# Patient Record
Sex: MALE | Race: WHITE | NOT HISPANIC OR LATINO | Employment: STUDENT | URBAN - METROPOLITAN AREA
[De-identification: names, ages, dates, MRNs, and addresses within clinical notes are randomized per-mention and may not be internally consistent; named-entity substitution may affect disease eponyms.]

---
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
## 2024-01-03 DIAGNOSIS — D69.1: Primary | ICD-10-CM

## 2024-01-08 DIAGNOSIS — D69.1: Primary | ICD-10-CM

## 2024-01-10 ENCOUNTER — ANESTHESIA EVENT (OUTPATIENT)
Dept: OPERATING ROOM | Facility: HOSPITAL | Age: 19
DRG: 158 | End: 2024-01-10
Payer: COMMERCIAL

## 2024-01-11 ENCOUNTER — HOSPITAL ENCOUNTER (OUTPATIENT)
Facility: HOSPITAL | Age: 19
Setting detail: OUTPATIENT SURGERY
Discharge: STILL ADMITTED OR ADMINISTRATIVE | DRG: 158 | End: 2024-01-11
Attending: DENTIST | Admitting: DENTIST
Payer: COMMERCIAL

## 2024-01-11 ENCOUNTER — HOSPITAL ENCOUNTER (INPATIENT)
Facility: HOSPITAL | Age: 19
LOS: 3 days | Discharge: HOME | DRG: 158 | End: 2024-01-14
Attending: PEDIATRICS | Admitting: PEDIATRICS
Payer: COMMERCIAL

## 2024-01-11 ENCOUNTER — ANESTHESIA (OUTPATIENT)
Dept: OPERATING ROOM | Facility: HOSPITAL | Age: 19
DRG: 158 | End: 2024-01-11
Payer: COMMERCIAL

## 2024-01-11 VITALS
HEART RATE: 76 BPM | SYSTOLIC BLOOD PRESSURE: 113 MMHG | OXYGEN SATURATION: 96 % | DIASTOLIC BLOOD PRESSURE: 57 MMHG | TEMPERATURE: 97.5 F | RESPIRATION RATE: 13 BRPM

## 2024-01-11 DIAGNOSIS — K08.409 S/P TOOTH EXTRACTION: ICD-10-CM

## 2024-01-11 DIAGNOSIS — Z91.89 AT HIGH RISK FOR POSTOPERATIVE COMPLICATIONS: Primary | ICD-10-CM

## 2024-01-11 LAB
ABO GROUP (TYPE) IN BLOOD: NORMAL
ANTIBODY SCREEN: NORMAL
RH FACTOR (ANTIGEN D): NORMAL

## 2024-01-11 PROCEDURE — 2720000007 HC OR 272 NO HCPCS: Performed by: DENTIST

## 2024-01-11 PROCEDURE — A41899 PR DENTAL SURGERY PROCEDURE: Performed by: NURSE ANESTHETIST, CERTIFIED REGISTERED

## 2024-01-11 PROCEDURE — 6360000002 HC RX 636 FACTOR: Mod: JZ

## 2024-01-11 PROCEDURE — 36415 COLL VENOUS BLD VENIPUNCTURE: CPT | Performed by: DENTIST

## 2024-01-11 PROCEDURE — 2500000001 HC RX 250 WO HCPCS SELF ADMINISTERED DRUGS (ALT 637 FOR MEDICARE OP)

## 2024-01-11 PROCEDURE — 3700000002 HC GENERAL ANESTHESIA TIME - EACH INCREMENTAL 1 MINUTE: Performed by: DENTIST

## 2024-01-11 PROCEDURE — 1130000001 HC PRIVATE PED ROOM DAILY

## 2024-01-11 PROCEDURE — 2500000005 HC RX 250 GENERAL PHARMACY W/O HCPCS

## 2024-01-11 PROCEDURE — A41899 PR DENTAL SURGERY PROCEDURE: Performed by: STUDENT IN AN ORGANIZED HEALTH CARE EDUCATION/TRAINING PROGRAM

## 2024-01-11 PROCEDURE — 2780000003 HC OR 278 NO HCPCS: Performed by: DENTIST

## 2024-01-11 PROCEDURE — 7100000002 HC RECOVERY ROOM TIME - EACH INCREMENTAL 1 MINUTE: Performed by: DENTIST

## 2024-01-11 PROCEDURE — 2500000005 HC RX 250 GENERAL PHARMACY W/O HCPCS: Performed by: DENTIST

## 2024-01-11 PROCEDURE — 2500000004 HC RX 250 GENERAL PHARMACY W/ HCPCS (ALT 636 FOR OP/ED)

## 2024-01-11 PROCEDURE — 7100000001 HC RECOVERY ROOM TIME - INITIAL BASE CHARGE: Performed by: DENTIST

## 2024-01-11 PROCEDURE — 99223 1ST HOSP IP/OBS HIGH 75: CPT | Performed by: PEDIATRICS

## 2024-01-11 PROCEDURE — 2500000004 HC RX 250 GENERAL PHARMACY W/ HCPCS (ALT 636 FOR OP/ED): Performed by: STUDENT IN AN ORGANIZED HEALTH CARE EDUCATION/TRAINING PROGRAM

## 2024-01-11 PROCEDURE — 2500000001 HC RX 250 WO HCPCS SELF ADMINISTERED DRUGS (ALT 637 FOR MEDICARE OP): Performed by: DENTIST

## 2024-01-11 PROCEDURE — 0CDWXZ1 EXTRACTION OF UPPER TOOTH, MULTIPLE, EXTERNAL APPROACH: ICD-10-PCS | Performed by: STUDENT IN AN ORGANIZED HEALTH CARE EDUCATION/TRAINING PROGRAM

## 2024-01-11 PROCEDURE — 6360000002 HC RX 636 FACTOR: Mod: JZ | Performed by: STUDENT IN AN ORGANIZED HEALTH CARE EDUCATION/TRAINING PROGRAM

## 2024-01-11 PROCEDURE — A4217 STERILE WATER/SALINE, 500 ML: HCPCS | Performed by: DENTIST

## 2024-01-11 PROCEDURE — 0CDXXZ1 EXTRACTION OF LOWER TOOTH, MULTIPLE, EXTERNAL APPROACH: ICD-10-PCS | Performed by: STUDENT IN AN ORGANIZED HEALTH CARE EDUCATION/TRAINING PROGRAM

## 2024-01-11 PROCEDURE — 86901 BLOOD TYPING SEROLOGIC RH(D): CPT | Performed by: DENTIST

## 2024-01-11 PROCEDURE — A4217 STERILE WATER/SALINE, 500 ML: HCPCS

## 2024-01-11 PROCEDURE — 3600000007 HC OR TIME - EACH INCREMENTAL 1 MINUTE - PROCEDURE LEVEL TWO: Performed by: DENTIST

## 2024-01-11 PROCEDURE — 2500000004 HC RX 250 GENERAL PHARMACY W/ HCPCS (ALT 636 FOR OP/ED): Performed by: DENTIST

## 2024-01-11 PROCEDURE — 3600000002 HC OR TIME - INITIAL BASE CHARGE - PROCEDURE LEVEL TWO: Performed by: DENTIST

## 2024-01-11 PROCEDURE — 3700000001 HC GENERAL ANESTHESIA TIME - INITIAL BASE CHARGE: Performed by: DENTIST

## 2024-01-11 PROCEDURE — 94760 N-INVAS EAR/PLS OXIMETRY 1: CPT

## 2024-01-11 PROCEDURE — 2500000005 HC RX 250 GENERAL PHARMACY W/O HCPCS: Performed by: STUDENT IN AN ORGANIZED HEALTH CARE EDUCATION/TRAINING PROGRAM

## 2024-01-11 RX ORDER — PETROLATUM 420 MG/G
OINTMENT TOPICAL
Status: DISCONTINUED | OUTPATIENT
Start: 2024-01-11 | End: 2024-01-14 | Stop reason: HOSPADM

## 2024-01-11 RX ORDER — LIDOCAINE HYDROCHLORIDE 20 MG/ML
INJECTION, SOLUTION INFILTRATION; PERINEURAL AS NEEDED
Status: DISCONTINUED | OUTPATIENT
Start: 2024-01-11 | End: 2024-01-11

## 2024-01-11 RX ORDER — CEFAZOLIN 1 G/1
INJECTION, POWDER, FOR SOLUTION INTRAVENOUS AS NEEDED
Status: DISCONTINUED | OUTPATIENT
Start: 2024-01-11 | End: 2024-01-11

## 2024-01-11 RX ORDER — SODIUM CHLORIDE 0.9 G/100ML
IRRIGANT IRRIGATION AS NEEDED
Status: DISCONTINUED | OUTPATIENT
Start: 2024-01-11 | End: 2024-01-11 | Stop reason: HOSPADM

## 2024-01-11 RX ORDER — DEXAMETHASONE SODIUM PHOSPHATE 4 MG/ML
INJECTION, SOLUTION INTRA-ARTICULAR; INTRALESIONAL; INTRAMUSCULAR; INTRAVENOUS; SOFT TISSUE AS NEEDED
Status: DISCONTINUED | OUTPATIENT
Start: 2024-01-11 | End: 2024-01-11

## 2024-01-11 RX ORDER — OXYCODONE HYDROCHLORIDE 5 MG/1
10 TABLET ORAL EVERY 4 HOURS PRN
Status: DISCONTINUED | OUTPATIENT
Start: 2024-01-11 | End: 2024-01-11 | Stop reason: HOSPADM

## 2024-01-11 RX ORDER — MORPHINE SULFATE 4 MG/ML
4 INJECTION INTRAVENOUS EVERY 4 HOURS PRN
Status: DISCONTINUED | OUTPATIENT
Start: 2024-01-11 | End: 2024-01-12

## 2024-01-11 RX ORDER — MORPHINE SULFATE 4 MG/ML
2 INJECTION INTRAVENOUS ONCE
Status: COMPLETED | OUTPATIENT
Start: 2024-01-11 | End: 2024-01-11

## 2024-01-11 RX ORDER — MIDAZOLAM HYDROCHLORIDE 1 MG/ML
INJECTION INTRAMUSCULAR; INTRAVENOUS AS NEEDED
Status: DISCONTINUED | OUTPATIENT
Start: 2024-01-11 | End: 2024-01-11

## 2024-01-11 RX ORDER — ACETAMINOPHEN 10 MG/ML
1000 INJECTION, SOLUTION INTRAVENOUS EVERY 6 HOURS
Status: DISCONTINUED | OUTPATIENT
Start: 2024-01-11 | End: 2024-01-12

## 2024-01-11 RX ORDER — CHLORHEXIDINE GLUCONATE ORAL RINSE 1.2 MG/ML
15 SOLUTION DENTAL AS NEEDED
Status: DISCONTINUED | OUTPATIENT
Start: 2024-01-11 | End: 2024-01-11

## 2024-01-11 RX ORDER — PROPOFOL 10 MG/ML
INJECTION, EMULSION INTRAVENOUS AS NEEDED
Status: DISCONTINUED | OUTPATIENT
Start: 2024-01-11 | End: 2024-01-11

## 2024-01-11 RX ORDER — PHENYLEPHRINE HCL IN 0.9% NACL 0.4MG/10ML
SYRINGE (ML) INTRAVENOUS AS NEEDED
Status: DISCONTINUED | OUTPATIENT
Start: 2024-01-11 | End: 2024-01-11

## 2024-01-11 RX ORDER — SODIUM CHLORIDE, SODIUM LACTATE, POTASSIUM CHLORIDE, CALCIUM CHLORIDE 600; 310; 30; 20 MG/100ML; MG/100ML; MG/100ML; MG/100ML
100 INJECTION, SOLUTION INTRAVENOUS CONTINUOUS
Status: DISCONTINUED | OUTPATIENT
Start: 2024-01-11 | End: 2024-01-11 | Stop reason: HOSPADM

## 2024-01-11 RX ORDER — ACETAMINOPHEN 325 MG/1
650 TABLET ORAL EVERY 4 HOURS PRN
Status: DISCONTINUED | OUTPATIENT
Start: 2024-01-11 | End: 2024-01-11 | Stop reason: HOSPADM

## 2024-01-11 RX ORDER — LIDOCAINE HYDROCHLORIDE 10 MG/ML
0.1 INJECTION INFILTRATION; PERINEURAL ONCE
Status: DISCONTINUED | OUTPATIENT
Start: 2024-01-11 | End: 2024-01-11 | Stop reason: HOSPADM

## 2024-01-11 RX ORDER — TRANEXAMIC ACID 10 MG/ML
1300 INJECTION, SOLUTION INTRAVENOUS ONCE
Status: DISCONTINUED | OUTPATIENT
Start: 2024-01-11 | End: 2024-01-11

## 2024-01-11 RX ORDER — MORPHINE SULFATE 4 MG/ML
2 INJECTION INTRAVENOUS EVERY 4 HOURS PRN
Status: DISCONTINUED | OUTPATIENT
Start: 2024-01-11 | End: 2024-01-11

## 2024-01-11 RX ORDER — HYDROMORPHONE HYDROCHLORIDE 1 MG/ML
0.2 INJECTION, SOLUTION INTRAMUSCULAR; INTRAVENOUS; SUBCUTANEOUS EVERY 5 MIN PRN
Status: DISCONTINUED | OUTPATIENT
Start: 2024-01-11 | End: 2024-01-11 | Stop reason: HOSPADM

## 2024-01-11 RX ORDER — TRANEXAMIC ACID 10 MG/ML
10 INJECTION, SOLUTION INTRAVENOUS EVERY 8 HOURS
Status: DISCONTINUED | OUTPATIENT
Start: 2024-01-11 | End: 2024-01-13

## 2024-01-11 RX ORDER — OXYCODONE HYDROCHLORIDE 5 MG/1
5 TABLET ORAL EVERY 4 HOURS PRN
Status: DISCONTINUED | OUTPATIENT
Start: 2024-01-11 | End: 2024-01-11 | Stop reason: HOSPADM

## 2024-01-11 RX ORDER — FENTANYL CITRATE 50 UG/ML
INJECTION, SOLUTION INTRAMUSCULAR; INTRAVENOUS AS NEEDED
Status: DISCONTINUED | OUTPATIENT
Start: 2024-01-11 | End: 2024-01-11

## 2024-01-11 RX ORDER — DEXTROSE MONOHYDRATE AND SODIUM CHLORIDE 5; .9 G/100ML; G/100ML
100 INJECTION, SOLUTION INTRAVENOUS CONTINUOUS
Status: DISCONTINUED | OUTPATIENT
Start: 2024-01-11 | End: 2024-01-12

## 2024-01-11 RX ORDER — SODIUM CHLORIDE, SODIUM LACTATE, POTASSIUM CHLORIDE, CALCIUM CHLORIDE 600; 310; 30; 20 MG/100ML; MG/100ML; MG/100ML; MG/100ML
INJECTION, SOLUTION INTRAVENOUS CONTINUOUS PRN
Status: DISCONTINUED | OUTPATIENT
Start: 2024-01-11 | End: 2024-01-11

## 2024-01-11 RX ORDER — TALC
3 POWDER (GRAM) TOPICAL NIGHTLY PRN
Status: DISCONTINUED | OUTPATIENT
Start: 2024-01-11 | End: 2024-01-14 | Stop reason: HOSPADM

## 2024-01-11 RX ORDER — HYDROMORPHONE HYDROCHLORIDE 1 MG/ML
0.4 INJECTION, SOLUTION INTRAMUSCULAR; INTRAVENOUS; SUBCUTANEOUS EVERY 5 MIN PRN
Status: DISCONTINUED | OUTPATIENT
Start: 2024-01-11 | End: 2024-01-11 | Stop reason: HOSPADM

## 2024-01-11 RX ORDER — LIDOCAINE HYDROCHLORIDE AND EPINEPHRINE 10; 10 MG/ML; UG/ML
INJECTION, SOLUTION INFILTRATION; PERINEURAL AS NEEDED
Status: DISCONTINUED | OUTPATIENT
Start: 2024-01-11 | End: 2024-01-11 | Stop reason: HOSPADM

## 2024-01-11 RX ORDER — ONDANSETRON HYDROCHLORIDE 2 MG/ML
INJECTION, SOLUTION INTRAVENOUS AS NEEDED
Status: DISCONTINUED | OUTPATIENT
Start: 2024-01-11 | End: 2024-01-11

## 2024-01-11 RX ORDER — CHLORHEXIDINE GLUCONATE ORAL RINSE 1.2 MG/ML
15 SOLUTION DENTAL 3 TIMES DAILY PRN
Status: DISCONTINUED | OUTPATIENT
Start: 2024-01-11 | End: 2024-01-13

## 2024-01-11 RX ORDER — TRANEXAMIC ACID 100 MG/ML
INJECTION, SOLUTION INTRAVENOUS AS NEEDED
Status: DISCONTINUED | OUTPATIENT
Start: 2024-01-11 | End: 2024-01-11 | Stop reason: HOSPADM

## 2024-01-11 RX ORDER — ROCURONIUM BROMIDE 10 MG/ML
INJECTION, SOLUTION INTRAVENOUS AS NEEDED
Status: DISCONTINUED | OUTPATIENT
Start: 2024-01-11 | End: 2024-01-11

## 2024-01-11 RX ORDER — TRANEXAMIC ACID 10 MG/ML
1000 INJECTION, SOLUTION INTRAVENOUS EVERY 8 HOURS
Status: DISCONTINUED | OUTPATIENT
Start: 2024-01-11 | End: 2024-01-11

## 2024-01-11 RX ADMIN — MORPHINE SULFATE 4 MG: 4 INJECTION INTRAVENOUS at 21:26

## 2024-01-11 RX ADMIN — TRANEXAMIC ACID 500 MG: 100 INJECTION INTRAVENOUS at 20:22

## 2024-01-11 RX ADMIN — DEXMEDETOMIDINE HYDROCHLORIDE 4 MCG: 100 INJECTION, SOLUTION INTRAVENOUS at 08:42

## 2024-01-11 RX ADMIN — MORPHINE SULFATE 2 MG: 4 INJECTION INTRAVENOUS at 12:13

## 2024-01-11 RX ADMIN — FENTANYL CITRATE 50 MCG: 50 INJECTION, SOLUTION INTRAMUSCULAR; INTRAVENOUS at 07:39

## 2024-01-11 RX ADMIN — SUGAMMADEX 200 MG: 100 INJECTION, SOLUTION INTRAVENOUS at 08:48

## 2024-01-11 RX ADMIN — Medication 6 MG: at 07:19

## 2024-01-11 RX ADMIN — FENTANYL CITRATE 50 MCG: 50 INJECTION, SOLUTION INTRAMUSCULAR; INTRAVENOUS at 08:02

## 2024-01-11 RX ADMIN — DEXMEDETOMIDINE HYDROCHLORIDE 4 MCG: 100 INJECTION, SOLUTION INTRAVENOUS at 08:44

## 2024-01-11 RX ADMIN — TRANEXAMIC ACID 740 MG: 100 INJECTION INTRAVENOUS at 13:03

## 2024-01-11 RX ADMIN — TRANEXAMIC ACID 500 MG: 100 INJECTION INTRAVENOUS at 15:46

## 2024-01-11 RX ADMIN — Medication 80 MCG: at 07:53

## 2024-01-11 RX ADMIN — DEXMEDETOMIDINE HYDROCHLORIDE 4 MCG: 100 INJECTION, SOLUTION INTRAVENOUS at 08:45

## 2024-01-11 RX ADMIN — PROPOFOL 150 MG: 10 INJECTION, EMULSION INTRAVENOUS at 07:40

## 2024-01-11 RX ADMIN — MORPHINE SULFATE 2 MG: 4 INJECTION INTRAVENOUS at 13:34

## 2024-01-11 RX ADMIN — Medication 6 L/MIN: at 08:55

## 2024-01-11 RX ADMIN — ACETAMINOPHEN 1000 MG: 10 INJECTION, SOLUTION INTRAVENOUS at 13:42

## 2024-01-11 RX ADMIN — COAGULATION FACTOR VIIA (RECOMBINANT) 6 MG: KIT at 11:33

## 2024-01-11 RX ADMIN — TRANEXAMIC ACID 740 MG: 100 INJECTION INTRAVENOUS at 22:00

## 2024-01-11 RX ADMIN — ROCURONIUM BROMIDE 60 MG: 10 INJECTION INTRAVENOUS at 07:41

## 2024-01-11 RX ADMIN — HYDROMORPHONE HYDROCHLORIDE 0.4 MG: 1 INJECTION, SOLUTION INTRAMUSCULAR; INTRAVENOUS; SUBCUTANEOUS at 09:36

## 2024-01-11 RX ADMIN — DEXMEDETOMIDINE HYDROCHLORIDE 4 MCG: 100 INJECTION, SOLUTION INTRAVENOUS at 08:47

## 2024-01-11 RX ADMIN — COAGULATION FACTOR VIIA (RECOMBINANT) 6 MG: KIT at 15:46

## 2024-01-11 RX ADMIN — SODIUM CHLORIDE, SODIUM LACTATE, POTASSIUM CHLORIDE, CALCIUM CHLORIDE: 600; 310; 30; 20 INJECTION, SOLUTION INTRAVENOUS at 07:48

## 2024-01-11 RX ADMIN — ACETAMINOPHEN 1000 MG: 10 INJECTION, SOLUTION INTRAVENOUS at 23:40

## 2024-01-11 RX ADMIN — HYDROMORPHONE HYDROCHLORIDE 0.2 MG: 1 INJECTION, SOLUTION INTRAMUSCULAR; INTRAVENOUS; SUBCUTANEOUS at 09:17

## 2024-01-11 RX ADMIN — MIDAZOLAM HYDROCHLORIDE 2 MG: 1 INJECTION, SOLUTION INTRAMUSCULAR; INTRAVENOUS at 07:34

## 2024-01-11 RX ADMIN — SODIUM CHLORIDE, POTASSIUM CHLORIDE, SODIUM LACTATE AND CALCIUM CHLORIDE: 600; 310; 30; 20 INJECTION, SOLUTION INTRAVENOUS at 07:26

## 2024-01-11 RX ADMIN — ONDANSETRON 4 MG: 2 INJECTION, SOLUTION INTRAMUSCULAR; INTRAVENOUS at 08:35

## 2024-01-11 RX ADMIN — LIDOCAINE HYDROCHLORIDE 100 MG: 20 INJECTION, SOLUTION INFILTRATION; PERINEURAL at 07:41

## 2024-01-11 RX ADMIN — COAGULATION FACTOR VIIA (RECOMBINANT) 6 MG: KIT at 13:42

## 2024-01-11 RX ADMIN — COAGULATION FACTOR VIIA (RECOMBINANT) 6 MG: KIT at 22:29

## 2024-01-11 RX ADMIN — COAGULATION FACTOR VIIA (RECOMBINANT) 6 MG: KIT at 20:22

## 2024-01-11 RX ADMIN — DEXAMETHASONE SODIUM PHOSPHATE 10 MG: 4 INJECTION INTRA-ARTICULAR; INTRALESIONAL; INTRAMUSCULAR; INTRAVENOUS; SOFT TISSUE at 08:01

## 2024-01-11 RX ADMIN — DEXMEDETOMIDINE HYDROCHLORIDE 4 MCG: 100 INJECTION, SOLUTION INTRAVENOUS at 08:40

## 2024-01-11 RX ADMIN — DEXTROSE AND SODIUM CHLORIDE 100 ML/HR: 5; 900 INJECTION, SOLUTION INTRAVENOUS at 13:50

## 2024-01-11 RX ADMIN — SODIUM CHLORIDE, POTASSIUM CHLORIDE, SODIUM LACTATE AND CALCIUM CHLORIDE 100 ML/HR: 600; 310; 30; 20 INJECTION, SOLUTION INTRAVENOUS at 09:18

## 2024-01-11 RX ADMIN — CHLORHEXIDINE GLUCONATE 0.12% ORAL RINSE 15 ML: 1.2 LIQUID ORAL at 20:22

## 2024-01-11 RX ADMIN — HYDROMORPHONE HYDROCHLORIDE 0.4 MG: 1 INJECTION, SOLUTION INTRAMUSCULAR; INTRAVENOUS; SUBCUTANEOUS at 09:29

## 2024-01-11 RX ADMIN — ACETAMINOPHEN 1000 MG: 10 INJECTION, SOLUTION INTRAVENOUS at 18:42

## 2024-01-11 RX ADMIN — MORPHINE SULFATE 4 MG: 4 INJECTION INTRAVENOUS at 17:47

## 2024-01-11 RX ADMIN — COAGULATION FACTOR VIIA (RECOMBINANT) 6 MG: KIT at 17:47

## 2024-01-11 RX ADMIN — CEFAZOLIN 2 G: 1 INJECTION, POWDER, FOR SOLUTION INTRAMUSCULAR; INTRAVENOUS at 07:41

## 2024-01-11 SDOH — SOCIAL STABILITY: SOCIAL INSECURITY: ABUSE: PEDIATRIC

## 2024-01-11 SDOH — ECONOMIC STABILITY: HOUSING INSECURITY: DO YOU FEEL UNSAFE GOING BACK TO THE PLACE WHERE YOU LIVE?: NO

## 2024-01-11 SDOH — SOCIAL STABILITY: SOCIAL INSECURITY
ASK PARENT OR GUARDIAN: ARE THERE TIMES WHEN YOU, YOUR CHILD(REN), OR ANY MEMBER OF YOUR HOUSEHOLD FEEL UNSAFE, HARMED, OR THREATENED AROUND PERSONS WITH WHOM YOU KNOW OR LIVE?: NO

## 2024-01-11 SDOH — SOCIAL STABILITY: SOCIAL INSECURITY: ARE THERE ANY APPARENT SIGNS OF INJURIES/BEHAVIORS THAT COULD BE RELATED TO ABUSE/NEGLECT?: NO

## 2024-01-11 SDOH — SOCIAL STABILITY: SOCIAL INSECURITY: HAVE YOU HAD ANY THOUGHTS OF HARMING ANYONE ELSE?: NO

## 2024-01-11 SDOH — HEALTH STABILITY: MENTAL HEALTH: CURRENT SMOKER: 0

## 2024-01-11 SDOH — SOCIAL STABILITY: SOCIAL INSECURITY: WERE YOU ABLE TO COMPLETE ALL THE BEHAVIORAL HEALTH SCREENINGS?: YES

## 2024-01-11 ASSESSMENT — ENCOUNTER SYMPTOMS
FEVER: 0
COLOR CHANGE: 0
DIZZINESS: 0
CHOKING: 0
EYE PAIN: 0
FATIGUE: 0
MYALGIAS: 0
ACTIVITY CHANGE: 0
COUGH: 0
LIGHT-HEADEDNESS: 0

## 2024-01-11 ASSESSMENT — ACTIVITIES OF DAILY LIVING (ADL)
HEARING - LEFT EAR: FUNCTIONAL
DRESSING YOURSELF: INDEPENDENT
JUDGMENT_ADEQUATE_SAFELY_COMPLETE_DAILY_ACTIVITIES: YES
PATIENT'S MEMORY ADEQUATE TO SAFELY COMPLETE DAILY ACTIVITIES?: YES
HEARING - RIGHT EAR: FUNCTIONAL
TOILETING: INDEPENDENT
WALKS IN HOME: INDEPENDENT
GROOMING: INDEPENDENT
BATHING: INDEPENDENT
ADEQUATE_TO_COMPLETE_ADL: YES
FEEDING YOURSELF: INDEPENDENT

## 2024-01-11 ASSESSMENT — PAIN - FUNCTIONAL ASSESSMENT
PAIN_FUNCTIONAL_ASSESSMENT: 0-10

## 2024-01-11 ASSESSMENT — PAIN SCALES - GENERAL
PAINLEVEL_OUTOF10: 6
PAINLEVEL_OUTOF10: 7
PAINLEVEL_OUTOF10: 6
PAINLEVEL_OUTOF10: 0 - NO PAIN
PAINLEVEL_OUTOF10: 6
PAINLEVEL_OUTOF10: 10 - WORST POSSIBLE PAIN
PAINLEVEL_OUTOF10: 7
PAINLEVEL_OUTOF10: 7
PAINLEVEL_OUTOF10: 0 - NO PAIN
PAINLEVEL_OUTOF10: 6
PAINLEVEL_OUTOF10: 7
PAINLEVEL_OUTOF10: 6
PAINLEVEL_OUTOF10: 4
PAINLEVEL_OUTOF10: 7

## 2024-01-11 ASSESSMENT — COLUMBIA-SUICIDE SEVERITY RATING SCALE - C-SSRS
1. IN THE PAST MONTH, HAVE YOU WISHED YOU WERE DEAD OR WISHED YOU COULD GO TO SLEEP AND NOT WAKE UP?: NO
6. HAVE YOU EVER DONE ANYTHING, STARTED TO DO ANYTHING, OR PREPARED TO DO ANYTHING TO END YOUR LIFE?: NO
2. HAVE YOU ACTUALLY HAD ANY THOUGHTS OF KILLING YOURSELF?: NO

## 2024-01-11 ASSESSMENT — PAIN DESCRIPTION - LOCATION: LOCATION: MOUTH

## 2024-01-11 ASSESSMENT — PAIN INTENSITY VAS: VAS_PAIN_GENERAL: 6

## 2024-01-11 NOTE — H&P
History Of Present Illness  Brad Benedict is a 18 y.o. male presenting with partially impacted third molars. Endorses pain associated with third molars and would like them removed. 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    Family History  No family history on file.     Allergies  Ibuprofen, Aspirin, Barley, Nsaids (non-steroidal anti-inflammatory drug), and Wheat    Review of Systems   HENT:  Positive for dental problem.    All other systems reviewed and are negative.       Physical Exam  Constitutional:       Appearance: Normal appearance. He is normal weight.   HENT:      Head:      Comments: JACQUES 45 mm  CNV and VII intact  #17, 32 partially erupted with operculum present  No lymphadenopathy     Right Ear: External ear normal.      Left Ear: External ear normal.      Nose: Nose normal.      Mouth/Throat:      Mouth: Mucous membranes are moist.   Eyes:      Extraocular Movements: Extraocular movements intact.      Conjunctiva/sclera: Conjunctivae normal.   Cardiovascular:      Rate and Rhythm: Normal rate.   Pulmonary:      Effort: Pulmonary effort is normal.   Musculoskeletal:         General: Normal range of motion.      Cervical back: Normal range of motion.   Skin:     General: Skin is warm.    Neurological:      General: No focal deficit present.      Mental Status: He is alert and oriented to person, place, and time.   Psychiatric:         Mood and Affect: Mood normal.         Behavior: Behavior normal.         Thought Content: Thought content normal.          Last Recorded Vitals  There were no vitals taken for this visit.      Assessment/Plan   Active Problems:  There are no active Hospital Problems.      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 Pt to receive NovoSeven 30 minutes prior to procedure to mitigate bleeding risk. Pt denies medications, significant PMH, and any allergies to medications    Discussed risks/ benefits/ alternatives of procedure in pre-op, informed consent obtained. All questions answered with reasonable degree of medical certainty. Patient will be greeted in OR 30 mins after Novoseven infusion         Alexis Townsend DDS

## 2024-01-11 NOTE — HOSPITAL COURSE
Hospital Course (1/11-***)  Brad Benedict is a 18 y.o. w/ Glanzmann Thrombasthenia admitted in the setting of hemorrhage risk after bilateral maxillary and mandibular third molar removal. Received Novoseven pretreatement. Tolerated procedure well. On arrival to the floor, patient was noted to be actively oozing blood from his lower molar sites, soaking through his current packing and into his mouth- but no high volume hemorrhage at this time. Placed on Novoseven q2h and started on TXA both topically and intravenously in the setting of increased bleeding. Initially NPO in the setting of bleeding, but advanced to mechanically soft diet in afternoon of 1/11. Pain managed initially with scheduled IV tylenol, made PO on ***. Required a couple doses of PRN morphine for pain management on post-operative day 0.

## 2024-01-11 NOTE — ANESTHESIA PROCEDURE NOTES
Airway  Date/Time: 1/11/2024 7:41 AM  Urgency: elective      Staffing  Performed: JIMMY   Authorized by: Sergio Magaña DO    Performed by: JIMMY Vargas  Patient location during procedure: OR    Indications and Patient Condition  Indications for airway management: anesthesia  Spontaneous ventilation: present  Sedation level: deep  Preoxygenated: yes  Patient position: sniffing  Mask difficulty assessment: 1 - vent by mask  Planned trial extubation    Final Airway Details  Final airway type: endotracheal airway      Successful airway: ETT  Cuffed: yes   Successful intubation technique: direct laryngoscopy  Endotracheal tube insertion site: oral  Blade: Cristal  Blade size: #4  ETT size (mm): 7.5  Cormack-Lehane Classification: grade I - full view of glottis  Placement verified by: chest auscultation   Measured from: teeth  ETT to teeth (cm): 22  Number of attempts at approach: 1

## 2024-01-11 NOTE — ANESTHESIA PREPROCEDURE EVALUATION
Patient: Brad Benedict    Procedure Information       Date/Time: 01/11/24 0715    Procedure: Extraction Tooth (Bilateral)    Location: [facility] OR 06 / [facility] OR    Surgeons: Leo Suárez DDS            Relevant Problems   Anesthesia (within normal limits)      Cardiovascular (within normal limits)      Endocrine (within normal limits)      GI (within normal limits)      /Renal (within normal limits)      Neuro/Psych   (+) Bilateral lumbar radiculopathy   (+) Depression      Pulmonary   (+) Exercise-induced asthma      Hematology  Received NovoSeven before surgery    (+) Microcytic anemia      Musculoskeletal   (+) Lumbar disc herniation      Hematologic   (+) Glanzmann thrombasthenia (CMS/HCC)       Clinical information reviewed:    Allergies                NPO Detail:  NPO/Void Status  Date of Last Liquid: 01/10/24  Time of Last Liquid: 2000  Date of Last Solid: 01/10/24  Time of Last Solid: 2000         Physical Exam    Airway  Mallampati: I  Neck ROM: full     Cardiovascular - normal exam  Rhythm: regular  Rate: normal     Dental - normal exam     Pulmonary - normal exam  Breath sounds clear to auscultation     Abdominal            Anesthesia Plan    History of general anesthesia?: yes  History of complications of general anesthesia?: no    ASA 2     general     The patient is not a current smoker.    intravenous induction   Anesthetic plan and risks discussed with patient.  Use of blood products discussed with patient who consented to blood products.    Plan discussed with CAA.

## 2024-01-11 NOTE — ANESTHESIA POSTPROCEDURE EVALUATION
Patient: Brad Benedict    Procedure Summary       Date: 01/11/24 Room / Location: [facility] OR 06 / [facility] OR    Anesthesia Start: 0736 Anesthesia Stop: 0905    Procedure: Extraction Of Teeth (Bilateral: Mouth) Diagnosis:     Surgeons: Leo Suárez DDS Responsible Provider: Sergio Magaña DO    Anesthesia Type: general ASA Status: 2            Anesthesia Type: general    Vitals Value Taken Time   /57 01/11/24 0937   Temp 36.4 °C (97.5 °F) 01/11/24 0902   Pulse 75 01/11/24 0940   Resp 14 01/11/24 0940   SpO2 95 % 01/11/24 0940   Vitals shown include unvalidated device data.    Anesthesia Post Evaluation    Patient location during evaluation: PACU  Patient participation: complete - patient participated  Level of consciousness: awake and alert  Pain management: adequate  Airway patency: patent  Cardiovascular status: acceptable  Respiratory status: acceptable  Hydration status: acceptable  Postoperative Nausea and Vomiting: none        There were no known notable events for this encounter.

## 2024-01-11 NOTE — ANESTHESIA PROCEDURE NOTES
Peripheral IV  Date/Time: 1/11/2024 7:47 AM  Inserted by: JIMMY Vargas    Placement  Needle size: 16 G  Laterality: right  Location: hand  Site prep: alcohol  Technique: anatomical landmarks  Attempts: 1

## 2024-01-11 NOTE — ANESTHESIA PROCEDURE NOTES
Peripheral IV  Date/Time: 1/11/2024 7:06 AM  Inserted by: JIMMY Vargas    Placement  Needle size: 20 G  Laterality: left  Location: hand  Local anesthetic: injectable  Site prep: alcohol  Technique: anatomical landmarks  Attempts: 1

## 2024-01-11 NOTE — H&P
History Of Present Illness  Brad Benedict is a 18 y.o. male with Glanzmann Thrombasthenia presenting after intraoperative extraction of impacted third molars by OMFS team. Is followed by Heme-Onc team outpatient. Deemed high risk for excessive bleeding, which resulted in intraoperative management and admission to hematology and oncology service for further observation during post operative period. Medications wise, he is on TXA and iron supplementation at home. Primary heme-onc team relayed plans for IV venofer during this admission. Reports no recent trauma, bleeding, or bruising as of the past week. Denies and cough, congestion, rhinorrhea, fevers, decreased PO, or excessive fatigue at this time. Endorsing 7-8/10 severe pain currently. Tolerated procedure well per OMFS note with approximately 60 mL of blood loss during procedure.     Past Medical History  Past Medical History:   Diagnosis Date    Acute myringitis, right ear 11/11/2015    Myringitis, acute, right    Chronic sinusitis, unspecified 12/02/2016    Clinical sinusitis    Cough, unspecified 10/14/2015    Cough    Pain in throat 12/02/2016    Throat discomfort    Personal history of other diseases of the respiratory system 10/14/2015    History of reactive airway disease    Qualitative platelet defects (CMS/[facility]) 07/20/2022    Glanzmann thrombasthenia       Surgical History  Past Surgical History:   Procedure Laterality Date    OTHER SURGICAL HISTORY  03/27/2019    Nasal cautery        Social History  He reports that he has quit smoking. His smoking use included cigarettes. He has quit using smokeless tobacco. He reports current alcohol use. He reports current drug use. Drug: Marijuana.    Family History  No family history on file.     Allergies  Ibuprofen, Aspirin, Barley, Nsaids (non-steroidal anti-inflammatory drug), and Wheat    Review of Systems   Constitutional:  Negative for activity change, fatigue and fever.   HENT:  Positive for dental problem and  drooling. Negative for congestion.    Eyes:  Negative for pain.   Respiratory:  Negative for cough and choking.    Musculoskeletal:  Negative for myalgias.   Skin:  Negative for color change.   Neurological:  Negative for dizziness and light-headedness.        Physical Exam  Constitutional:       Appearance: Normal appearance.      Comments: Packing in mouth restricting articulation, able to understand and respond to questioning   HENT:      Head: Normocephalic.      Nose: Nose normal.      Mouth/Throat:      Mouth: Mucous membranes are moist.      Comments: Packing inside mouth applied to bilateral molars, soaked through with blood.  Eyes:      Extraocular Movements: Extraocular movements intact.      Conjunctiva/sclera: Conjunctivae normal.      Pupils: Pupils are equal, round, and reactive to light.   Neck:      Comments: Binder in place  Cardiovascular:      Rate and Rhythm: Normal rate and regular rhythm.      Pulses: Normal pulses.      Heart sounds: Normal heart sounds.   Pulmonary:      Effort: Pulmonary effort is normal.      Breath sounds: Normal breath sounds.   Abdominal:      General: Abdomen is flat.      Palpations: Abdomen is soft.   Skin:     Capillary Refill: Capillary refill takes less than 2 seconds.      Coloration: Skin is not pale.      Findings: No rash.   Neurological:      General: No focal deficit present.      Mental Status: He is alert.   Psychiatric:         Mood and Affect: Mood normal.      Comments: Mood appropriate for post-operative period          Last Recorded Vitals  Blood pressure 149/84, pulse 71, temperature 36.3 °C (97.3 °F), resp. rate 20, SpO2 98 %.    Relevant Results  Scheduled medications  acetaminophen, 1,000 mg, intravenous, q6h  coagulation factor VIIa (recomb), 6 mg, intravenous, q2h  tranexamic acid, 500 mg, oral, TID  tranexamic acid, 10 mg/kg (Dosing Weight), intravenous, q8h      Continuous medications  D5 % and 0.9 % sodium chloride, 100 mL/hr      PRN  medications  PRN medications: morphine  Results for orders placed or performed during the hospital encounter of 01/11/24 (from the past 24 hour(s))   Type and screen   Result Value Ref Range    ABO TYPE O     Rh TYPE POS     ANTIBODY SCREEN NEG           Assessment/Plan   Principal Problem:    Glanzmann thrombasthenia (CMS/HCC)  Active Problems:    At high risk for postoperative complications      Brad Benedict is a 18 y.o. w/ Glanzmann Thrombasthenia admitted in the setting of hemorrhage risk after bilateral maxillary and mandibular third molar removal. On arrival to the floor, patient was noted to be actively oozing blood from his lower molar sites, soaking through his current packing and into his mouth- but no high volume hemorrhage at this time. Surgical team assessed patient at bedside, who reported this is to be expected over the first 24 hours. Recommended packing techniques and topical TXA. Otherwise patient is hemodynamically stable and airway is patent. No other significant findings on physical exam. Received NovoSeven prior to operative period and will schedule treatments at this time. Will also begin IV TXA at this time to assist in coagulation.      Requires continued admission at this time for hemodynamic monitoring in the setting of bleeding risk.    Plan:  #Post-op bleeding  - NovoSeven 6 mg q2h  - IV TXA 10 mg/kg q8h  - TXA soaks TID    #Post-op pain  - AVOID NSAID's  - IV Tylenol 1000mg q6h  - IV Morphine 2 mg q4h PRN for severe pain    #Nutrition  - NPO until bleeding under control  - D5NS mIVF    Labs: AM CBCd       Patient seen and staffed with Dr. Ana Muhammad MD    I saw and evaluated the patient. I personally obtained the key and critical portions of the history and physical exam or was physically present for key and critical portions performed by the resident/fellow. I reviewed the resident/fellow's documentation and discussed the patient with the resident/fellow. I agree with  the resident/fellow's medical decision making as documented in the note.

## 2024-01-11 NOTE — OP NOTE
Extraction Of Teeth (B) Operative Note     Date: 2024  OR Location: [facility] OR    Name: Brad Benedict, : 2005, Age: 18 y.o., MRN: [id number], Sex: male    Diagnosis  * No Diagnosis Codes entered * * No Diagnosis Codes entered *     Procedures  Extraction Of Teeth  38785 - [place] UNLISTED PROCEDURE DENTOALVEOLAR STRUCTURES      Surgeons      * Leo Suárez - Primary    Resident/Fellow/Other Assistant:  Surgeon(s) and Role:     * Cyril Mancilla DDS - Resident - Assisting     * Fallon Duff DDS - Resident - Assisting    Procedure Summary  Anesthesia: * No anesthesia type entered *  ASA: II  Anesthesia Staff: Anesthesiologist: Sergio Magaña DO  CRNA: DORIS Decker-CRNA  C-AA: JIMMY Vargas  Estimated Blood Loss: 60 mL  Intra-op Medications:   Medication Name Total Dose   sodium chloride 0.9 % irrigation solution 1,000 mL   lidocaine-epinephrine (Xylocaine W/EPI) 1 %-1:100,000 injection 10 mL   gelatin absorbable (Gelfoam) 100 sponge 1 each   microfibrllar collagen (Hemostat) pad 1 each   tranexamic acid (Cyklokapron) injection 1 g   oxygen (O2) therapy 30 L   coagulation factor VIIa (recomb) (NovoSeven RT) 2 mg (2,000 mcg) injection 6 mg 6 mg              Anesthesia Record               Intraprocedure I/O Totals          Intake    Dexmedetomidine 5.00 mL    The total shown is the total volume documented since Anesthesia Start was filed.    LR infusion 250.00 mL    Total Intake 255 mL       Output    Est. Blood Loss 60 mL    Total Output 60 mL       Net    Net Volume 195 mL          Specimen: No specimens collected     Staff:   Circulator: Daja Alas RN  Scrub Person: Zee Pate; Lilian Dunaway         Drains and/or Catheters: * None in log *    Tourniquet Times:         Implants:     Findings: Impacted teeth #1,16,17,32     Indications: Brad Benedict is an 18 y.o. male who is having surgery for symptomatic, impacted wisdom teeth.     The patient was seen in the preoperative area. The risks, benefits,  complications, treatment options, non-operative alternatives, expected recovery and outcomes were discussed with the patient. The possibilities of reaction to medication, pulmonary aspiration, injury to surrounding structures, bleeding, recurrent infection, the need for additional procedures, failure to diagnose a condition, and creating a complication requiring transfusion or operation were discussed with the patient. The patient concurred with the proposed plan, giving informed consent.  The site of surgery was properly noted/marked if necessary per policy. The patient has been actively warmed in preoperative area.     Confirmed patient received transfusion recommended by heme on in peroperative area 30 minutes prior to procedure.     Patient was transferred to OR bed. Time out was performed. Pre- op antibiotic and steroids given.   Local anesthesia achieved using 1% lidocaine with epi as bilateral ROCAEL blocks and infiltrations.   #15 blade used to create full thickness incisions over impacted teeth #1,16,17, 32. Periosteal elevator used to reflect soft tissue.   Surgical hand piece with fissure bur used to create buccal trough around teeth #17,32.   Teeth #1,16,17,32 extracted. Sites irrigated well with NS.   Gel foam wrapped in surgicel placed in sockets. Primary closure achieved using 3-0 vicryl sutures.   Gauze soaked in TXA placed over top with patient biting down.     Care transferred back to anesthesia team, who proceeded to extubate patient and transfer him to PACU.   Procedure Details: Extraction of teeth #1,16,17,32   Complications:  None; patient tolerated the procedure well.    Disposition: PACU - hemodynamically stable.  Condition: stable         Additional Details:     Attending Attestation:     Leo Suárez  Phone Number: [phone]

## 2024-01-12 LAB
BASOPHILS # BLD MANUAL: 0 X10*3/UL (ref 0–0.1)
BASOPHILS NFR BLD MANUAL: 0 %
DACRYOCYTES BLD QL SMEAR: NORMAL
EOSINOPHIL # BLD MANUAL: 0 X10*3/UL (ref 0–0.7)
EOSINOPHIL NFR BLD MANUAL: 0 %
ERYTHROCYTE [DISTWIDTH] IN BLOOD BY AUTOMATED COUNT: 22.6 % (ref 11.5–14.5)
FERRITIN SERPL-MCNC: 18 NG/ML (ref 20–300)
HCT VFR BLD AUTO: 32.8 % (ref 41–52)
HGB BLD-MCNC: 9.1 G/DL (ref 13.5–17.5)
HGB RETIC QN: 18 PG (ref 28–38)
HYPOCHROMIA BLD QL SMEAR: NORMAL
IMM GRANULOCYTES # BLD AUTO: 0.03 X10*3/UL (ref 0–0.7)
IMM GRANULOCYTES NFR BLD AUTO: 0.4 % (ref 0–0.9)
IMMATURE RETIC FRACTION: 17.2 %
IRON SATN MFR SERPL: ABNORMAL %
IRON SERPL-MCNC: <10 UG/DL (ref 35–150)
LYMPHOCYTES # BLD MANUAL: 1.32 X10*3/UL (ref 1.2–4.8)
LYMPHOCYTES NFR BLD MANUAL: 15.4 %
MCH RBC QN AUTO: 19.1 PG (ref 26–34)
MCHC RBC AUTO-ENTMCNC: 27.7 G/DL (ref 32–36)
MCV RBC AUTO: 69 FL (ref 80–100)
MONOCYTES # BLD MANUAL: 0.73 X10*3/UL (ref 0.1–1)
MONOCYTES NFR BLD MANUAL: 8.5 %
NEUTS SEG # BLD MANUAL: 6.54 X10*3/UL (ref 1.2–7)
NEUTS SEG NFR BLD MANUAL: 76.1 %
NRBC BLD-RTO: 0 /100 WBCS (ref 0–0)
OVALOCYTES BLD QL SMEAR: NORMAL
PLATELET # BLD AUTO: 175 X10*3/UL (ref 150–450)
POLYCHROMASIA BLD QL SMEAR: NORMAL
RBC # BLD AUTO: 4.76 X10*6/UL (ref 4.5–5.9)
RBC MORPH BLD: NORMAL
RETICS #: 0.06 X10*6/UL (ref 0.02–0.12)
RETICS/RBC NFR AUTO: 1.2 % (ref 0.5–2)
SCHISTOCYTES BLD QL SMEAR: NORMAL
TIBC SERPL-MCNC: ABNORMAL UG/DL
TOTAL CELLS COUNTED BLD: 117
UIBC SERPL-MCNC: 392 UG/DL (ref 110–370)
WBC # BLD AUTO: 8.6 X10*3/UL (ref 4.4–11.3)

## 2024-01-12 PROCEDURE — 85007 BL SMEAR W/DIFF WBC COUNT: CPT

## 2024-01-12 PROCEDURE — 6360000002 HC RX 636 FACTOR: Mod: JZ

## 2024-01-12 PROCEDURE — 6360000002 HC RX 636 FACTOR: Mod: JZ | Performed by: STUDENT IN AN ORGANIZED HEALTH CARE EDUCATION/TRAINING PROGRAM

## 2024-01-12 PROCEDURE — 2500000001 HC RX 250 WO HCPCS SELF ADMINISTERED DRUGS (ALT 637 FOR MEDICARE OP)

## 2024-01-12 PROCEDURE — A4217 STERILE WATER/SALINE, 500 ML: HCPCS

## 2024-01-12 PROCEDURE — 85027 COMPLETE CBC AUTOMATED: CPT

## 2024-01-12 PROCEDURE — 2500000004 HC RX 250 GENERAL PHARMACY W/ HCPCS (ALT 636 FOR OP/ED)

## 2024-01-12 PROCEDURE — 83550 IRON BINDING TEST: CPT

## 2024-01-12 PROCEDURE — 85045 AUTOMATED RETICULOCYTE COUNT: CPT

## 2024-01-12 PROCEDURE — 36415 COLL VENOUS BLD VENIPUNCTURE: CPT

## 2024-01-12 PROCEDURE — 99233 SBSQ HOSP IP/OBS HIGH 50: CPT | Performed by: PEDIATRICS

## 2024-01-12 PROCEDURE — 2500000004 HC RX 250 GENERAL PHARMACY W/ HCPCS (ALT 636 FOR OP/ED): Performed by: STUDENT IN AN ORGANIZED HEALTH CARE EDUCATION/TRAINING PROGRAM

## 2024-01-12 PROCEDURE — 2500000001 HC RX 250 WO HCPCS SELF ADMINISTERED DRUGS (ALT 637 FOR MEDICARE OP): Performed by: STUDENT IN AN ORGANIZED HEALTH CARE EDUCATION/TRAINING PROGRAM

## 2024-01-12 PROCEDURE — 82728 ASSAY OF FERRITIN: CPT

## 2024-01-12 PROCEDURE — 1130000001 HC PRIVATE PED ROOM DAILY

## 2024-01-12 PROCEDURE — 2500000005 HC RX 250 GENERAL PHARMACY W/O HCPCS

## 2024-01-12 RX ORDER — MORPHINE SULFATE 4 MG/ML
4 INJECTION INTRAVENOUS EVERY 4 HOURS PRN
Status: DISCONTINUED | OUTPATIENT
Start: 2024-01-12 | End: 2024-01-14 | Stop reason: HOSPADM

## 2024-01-12 RX ORDER — OXYCODONE HYDROCHLORIDE 5 MG/1
5 TABLET ORAL EVERY 6 HOURS PRN
Status: DISCONTINUED | OUTPATIENT
Start: 2024-01-12 | End: 2024-01-13

## 2024-01-12 RX ORDER — AMOXICILLIN 500 MG/1
500 CAPSULE ORAL EVERY 8 HOURS SCHEDULED
Status: DISCONTINUED | OUTPATIENT
Start: 2024-01-12 | End: 2024-01-14 | Stop reason: HOSPADM

## 2024-01-12 RX ORDER — ACETAMINOPHEN 325 MG/1
650 TABLET ORAL EVERY 6 HOURS
Status: DISCONTINUED | OUTPATIENT
Start: 2024-01-12 | End: 2024-01-14 | Stop reason: HOSPADM

## 2024-01-12 RX ADMIN — COAGULATION FACTOR VIIA (RECOMBINANT) 6 MG: KIT at 08:35

## 2024-01-12 RX ADMIN — TRANEXAMIC ACID 740 MG: 100 INJECTION INTRAVENOUS at 05:47

## 2024-01-12 RX ADMIN — ACETAMINOPHEN 650 MG: 325 TABLET ORAL at 17:54

## 2024-01-12 RX ADMIN — MELATONIN 3 MG: 3 TAB ORAL at 21:51

## 2024-01-12 RX ADMIN — MORPHINE SULFATE 4 MG: 4 INJECTION INTRAVENOUS at 20:09

## 2024-01-12 RX ADMIN — TRANEXAMIC ACID 500 MG: 100 INJECTION INTRAVENOUS at 14:21

## 2024-01-12 RX ADMIN — DEXTROSE AND SODIUM CHLORIDE 100 ML/HR: 5; 900 INJECTION, SOLUTION INTRAVENOUS at 06:24

## 2024-01-12 RX ADMIN — ACETAMINOPHEN 1000 MG: 10 INJECTION, SOLUTION INTRAVENOUS at 05:47

## 2024-01-12 RX ADMIN — OXYCODONE HYDROCHLORIDE 5 MG: 5 TABLET ORAL at 22:06

## 2024-01-12 RX ADMIN — COAGULATION FACTOR VIIA (RECOMBINANT) 6 MG: KIT at 10:40

## 2024-01-12 RX ADMIN — COAGULATION FACTOR VIIA (RECOMBINANT) 6 MG: KIT at 02:22

## 2024-01-12 RX ADMIN — COAGULATION FACTOR VIIA (RECOMBINANT) 6 MG: KIT at 06:19

## 2024-01-12 RX ADMIN — MORPHINE SULFATE 4 MG: 4 INJECTION INTRAVENOUS at 10:45

## 2024-01-12 RX ADMIN — MORPHINE SULFATE 4 MG: 4 INJECTION INTRAVENOUS at 02:27

## 2024-01-12 RX ADMIN — MORPHINE SULFATE 4 MG: 4 INJECTION INTRAVENOUS at 06:19

## 2024-01-12 RX ADMIN — OXYCODONE HYDROCHLORIDE 5 MG: 5 TABLET ORAL at 15:58

## 2024-01-12 RX ADMIN — COAGULATION FACTOR VIIA (RECOMBINANT) 6 MG: KIT at 16:34

## 2024-01-12 RX ADMIN — ACETAMINOPHEN 650 MG: 325 TABLET ORAL at 12:08

## 2024-01-12 RX ADMIN — TRANEXAMIC ACID 740 MG: 100 INJECTION INTRAVENOUS at 21:48

## 2024-01-12 RX ADMIN — TRANEXAMIC ACID 740 MG: 100 INJECTION INTRAVENOUS at 15:01

## 2024-01-12 RX ADMIN — COAGULATION FACTOR VIIA (RECOMBINANT) 6 MG: KIT at 20:18

## 2024-01-12 RX ADMIN — COAGULATION FACTOR VIIA (RECOMBINANT) 6 MG: KIT at 04:43

## 2024-01-12 RX ADMIN — TRANEXAMIC ACID 500 MG: 100 INJECTION INTRAVENOUS at 08:35

## 2024-01-12 RX ADMIN — COAGULATION FACTOR VIIA (RECOMBINANT) 6 MG: KIT at 13:32

## 2024-01-12 RX ADMIN — IRON SUCROSE 300 MG: 20 INJECTION, SOLUTION INTRAVENOUS at 16:35

## 2024-01-12 RX ADMIN — AMOXICILLIN 500 MG: 500 CAPSULE ORAL at 15:58

## 2024-01-12 ASSESSMENT — PAIN INTENSITY VAS
VAS_PAIN_GENERAL: 5
VAS_PAIN_GENERAL: 6
VAS_PAIN_GENERAL: 7
VAS_PAIN_BASICVITALS_IP: 6
VAS_PAIN_BASICVITALS_IP: 7
VAS_PAIN_BASICVITALS_IP: 4

## 2024-01-12 ASSESSMENT — PAIN - FUNCTIONAL ASSESSMENT
PAIN_FUNCTIONAL_ASSESSMENT: 0-10

## 2024-01-12 ASSESSMENT — PAIN DESCRIPTION - LOCATION: LOCATION: MOUTH

## 2024-01-12 ASSESSMENT — PAIN SCALES - GENERAL
PAINLEVEL_OUTOF10: 6
PAINLEVEL_OUTOF10: 6
PAINLEVEL_OUTOF10: 5 - MODERATE PAIN
PAINLEVEL_OUTOF10: 4
PAINLEVEL_OUTOF10: 2
PAINLEVEL_OUTOF10: 6
PAINLEVEL_OUTOF10: 4

## 2024-01-12 NOTE — PROGRESS NOTES
Daily Progress Note  [facility]  Pediatric Hematology & Oncology    Patient's Name: Brad Benedict  : 2005  MR#: [id number]  Attending Physician: Sid Perez MD  LOS: Hospital Day: 2    Subjective   Overnight attempted to space NovoSeven to q4hr but returned to q2hr d/t increased bleeding. Morphine x4 ovn.         Objective     Diet:  Dietary Orders (From admission, onward)       Start     Ordered    24  May Participate in Room Service  Once        Question:  .  Answer:  Yes    24 0753    01/11/24 1457  Pediatric diet Regular; Easy to chew  Diet effective now        Question Answer Comment   Diet type Regular    Texture Easy to chew        24                    Medications:  Scheduled Meds: acetaminophen, 650 mg, oral, q6h  amoxicillin, 500 mg, oral, q8h LYNETTE  coagulation factor VIIa (recomb), 6 mg, intravenous, q3h  iron sucrose, 300 mg, intravenous, Once  tranexamic acid, 500 mg, oral, TID  tranexamic acid, 10 mg/kg (Dosing Weight), intravenous, q8h      Continuous Infusions:    PRN Meds: PRN medications: chlorhexidine, melatonin, morphine, white petrolatum    Peripheral IV 24 20 G Left Hand (Active)   Site Assessment Clean;Dry;Intact 24 1100   Dressing Type Transparent 24 1100   Line Status Infusing 24 1100   Dressing Status Clean;Dry;Occlusive 24 1100       Vitals:  Temp:  [36.3 °C (97.3 °F)-36.5 °C (97.7 °F)] 36.4 °C (97.5 °F)  Heart Rate:  [77-93] 77  Resp:  [16-18] 18  BP: (103-134)/(52-66) 114/66  Temp (24hrs), Av.3 °C (97.4 °F), Min:36.3 °C (97.3 °F), Max:36.5 °C (97.7 °F)    Wt Readings from Last 3 Encounters:   24 72.6 kg (160 lb 0.9 oz) (57 %, Z= 0.18)*   23 74.1 kg (163 lb 5.8 oz) (62 %, Z= 0.31)*   22 69 kg (152 lb 2 oz) (53 %, Z= 0.08)*     * Growth percentiles are based on [facility] (Boys, 2-20 Years) data.        Pain Score: 4  VAS Pain Score:  [4-7]       I/O:    Intake/Output Summary  (Last 24 hours) at 1/12/2024 1252  Last data filed at 1/12/2024 1110  Gross per 24 hour   Intake 3110.32 ml   Output 0 ml   Net 3110.32 ml        Exam:   Physical Exam  Constitutional:       General: He is not in acute distress.  HENT:      Head: Normocephalic and atraumatic.      Nose: Nose normal.      Mouth/Throat:      Mouth: Mucous membranes are moist.      Comments: Oozing from inferior extraction site  Eyes:      Extraocular Movements: Extraocular movements intact.      Conjunctiva/sclera: Conjunctivae normal.   Cardiovascular:      Rate and Rhythm: Normal rate and regular rhythm.      Heart sounds: No murmur heard.     No friction rub. No gallop.   Pulmonary:      Effort: Pulmonary effort is normal.      Breath sounds: Normal breath sounds.   Abdominal:      General: There is no distension.      Palpations: Abdomen is soft.      Tenderness: There is no abdominal tenderness.   Skin:     General: Skin is warm and dry.      Capillary Refill: Capillary refill takes less than 2 seconds.   Neurological:      General: No focal deficit present.      Mental Status: He is alert and oriented to person, place, and time.         Lab Studies Reviewed:  Results for orders placed or performed during the hospital encounter of 01/11/24 (from the past 24 hour(s))   CBC and Auto Differential   Result Value Ref Range    WBC 8.6 4.4 - 11.3 x10*3/uL    nRBC 0.0 0.0 - 0.0 /100 WBCs    RBC 4.76 4.50 - 5.90 x10*6/uL    Hemoglobin 9.1 (L) 13.5 - 17.5 g/dL    Hematocrit 32.8 (L) 41.0 - 52.0 %    MCV 69 (L) 80 - 100 fL    MCH 19.1 (L) 26.0 - 34.0 pg    MCHC 27.7 (L) 32.0 - 36.0 g/dL    RDW 22.6 (H) 11.5 - 14.5 %    Platelets 175 150 - 450 x10*3/uL    Immature Granulocytes %, Automated 0.4 0.0 - 0.9 %    Immature Granulocytes Absolute, Automated 0.03 0.00 - 0.70 x10*3/uL   Manual Differential   Result Value Ref Range    Neutrophils %, Manual 76.1 40.0 - 80.0 %    Lymphocytes %, Manual 15.4 13.0 - 44.0 %    Monocytes %, Manual 8.5 2.0 -  10.0 %    Eosinophils %, Manual 0.0 0.0 - 6.0 %    Basophils %, Manual 0.0 0.0 - 2.0 %    Seg Neutrophils Absolute, Manual 6.54 1.20 - 7.00 x10*3/uL    Lymphocytes Absolute, Manual 1.32 1.20 - 4.80 x10*3/uL    Monocytes Absolute, Manual 0.73 0.10 - 1.00 x10*3/uL    Eosinophils Absolute, Manual 0.00 0.00 - 0.70 x10*3/uL    Basophils Absolute, Manual 0.00 0.00 - 0.10 x10*3/uL    Total Cells Counted 117     RBC Morphology See Below     Polychromasia Mild     Hypochromia Mild     RBC Fragments Few     Ovalocytes Few     Teardrop Cells Few            Assessment/Plan   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 This morning he has mild bleeding from 1/4 extraction sites, so we will attempt to space the NovoSeven to q4hr again today. We will transition to PO meds as tolerated as well. We will also start amoxicillin per OMFS and obtain iron studies before administering Venofer per heme. Detailed plan below.     Post-op bleeding  - NovoSeven 6mg q4hr  - IV TXA 10mg/kg q8hr  - TXA soaks TID    Post-op pain  - Tylenol q6hr  - Oxycodone 5mg q6hr PRN    Post-op infection ppx  - Amoxicillin 500mg q8hr    Iron deficiency anemia  - Ferritin, iron studies, retic  - Venofer 300mg     Patient seen and discussed with my attending, Dr. Perez.     Marycruz Valiente MD  PGY-1, Pediatrics    I saw and evaluated the patient. I personally obtained the key and critical portions of the history and physical exam or was physically present for key and critical portions performed by the resident/fellow. I reviewed the resident/fellow's documentation and discussed the patient with the resident/fellow. I agree with the resident/fellow's medical decision making as documented in the note.

## 2024-01-12 NOTE — CARE PLAN
"    The clinical goals for the shift include Patient will have decreased bleeding and mouth will continue to clot this shift  Patient has remained afebrile, VSS on room air this shift. One episode of increased BP of which the team was notified. Patient is tolerating regular, easy to chew diet without emesis and has had adequate intake and output. Patient has had increased pain this shift. Required one dose of PRN morphine and then one dose of PRN oxy after prn order change to PO. Patient still having pain after prn oxy. Scheduled tylenol was due at this time. Team was also ntoified of increased pain at this time. Patient still having bleeding from the mouth. Per patient \"feels like yes than overnight.\" Patient better able to tolerate swallowing PO pills this shift. Mom was at bedside.   Patient received factor doses per orders. Patient received iron transfusion per orders. Patient required new IV, tolerated placement well.       Problem: Pain  Goal: Takes deep breaths with improved pain control throughout the shift  Outcome: Progressing  Goal: Turns in bed with improved pain control throughout the shift  Outcome: Progressing  Goal: Walks with improved pain control throughout the shift  Outcome: Progressing  Goal: Performs ADL's with improved pain control throughout shift  Outcome: Progressing  Goal: Participates in PT with improved pain control throughout the shift  Outcome: Progressing  Goal: Free from opioid side effects throughout the shift  Outcome: Progressing  Goal: Free from acute confusion related to pain meds throughout the shift  Outcome: Progressing     "

## 2024-01-12 NOTE — PROGRESS NOTES
Brad Benedict is a 18 y.o. male on day 1 of admission. The patient is 1 day status post extraction of teeth #1,16,17,32.     Subjective   The patient has minor oozing of blood at the extraction site #32. The patient has post extraction pain that is being managed with primary team. The patient reported having discomfort due to swelling of his mandible bilaterally. The patient expressed that he does not want to swallow due to sore throat.      Objective     CONSTITUTIONAL/GENERAL:  Well appearing. No acute distress. Laying comfortably in bed.     HEAD:  Bilateral mandibular swelling as a result of extraction yesterday.    EYES:  EOM intact, sclera normal, without conjunctival erythema or drainage.    EARS:  Normal external ears, external auditory canals.     NOSE:  External nose midline, no bleeding or drainage. Sinuses non-tender to palpation bilaterally.    ORAL CAVITY/OROPHARYNX/LIPS:  Normal and moist mucous membranes, normal floor of mouth/tongue/oropharnx, no masses or lesions. No fractures or avulsion of teeth. Stable and reproducible occlusion. No TMJ clicking or popping. Maximal incisal opening ~25mm.  Extraction site # 1,16,17 hemostatic with no oozing. Site #32 with minor oozing. No liver clot observed. Right lower lip swelling likely due to lip biting.     NECK/LMYPH/GLANDS: Parotid and submandibular glands without edema or tenderness bilaterally. Patient tolerating oral secretions without issue. No lymphadenopathy of head or neck. Mild neck limited motion due to post-op swelling.     RESPIRATION/VOICE:  Breathing comfortably on room air. No hoarseness, wheezing, stridor, or other abnormality.     NEURO:  Awake and alert. Oriented to person, place, and time.  Cranial nerves II-XII grossly intact and symmetric bilaterally. No anesthesia or paresthesia of CN V bilaterally. CN VII without defects, patient showing full facial range of expression.    EXTREMITIES: No lesions or abnormalities visualized. Denies  "tenderness to palpation bilaterally.      PSYCH:  Appropriate mood and affect.      Last Recorded Vitals  Blood pressure 117/64, pulse 82, temperature 36.4 °C (97.5 °F), temperature source Axillary, resp. rate 16, height 1.746 m (5' 8.74\"), weight 72.6 kg (160 lb 0.9 oz), SpO2 98 %.  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 The patient still have minor bleeding from extraction site #32.     Recs:   - Non - chew diet for 7 days.   - Chlorhexidine 0.12% oral rinse TID for 7 days   - Medrol dose pack if not contraindicated   - Pain management per primary team   - Amoxicillin 500mg for 7 days   - [facility] will follow-up later today afternoon to confirm that the patient is not bleeding  - The patient will follow-up at [facility] out patient clinic 1 week after discharge. Clinic information included at the end of the note      Shree Granado DMD  Oral and Maxillofacial Surgery intern   Pager [phone]    The Oral & Maxillofacial Surgery clinic is located on the first floor within the [facility] ([address] [place] [address]).  Our office phone number for patients is [phone]. For any questions regarding patient care, please contact the [email or web address] at [phone]. Patients can contact the [email or web address] through the hospital  [phone].     "

## 2024-01-12 NOTE — SIGNIFICANT EVENT
Evaluated patient for hemostasis ~8:00 PM. Pt was resting comfortably in bed without gauze packing intraorally. Pt denied active bleeding and on exam this was confirmed. Surgical sites were slightly oozing, no obvious bleeding. Reviewed instructions for hemostatic control with patient and mother, all questions asked and answered.    OMFS will continue to follow until discharge    Please reach out with any questions or concerns #[phone]    Fredis Townsend PGY1

## 2024-01-13 LAB
BASOPHILS # BLD MANUAL: 0.06 X10*3/UL (ref 0–0.1)
BASOPHILS NFR BLD MANUAL: 0.8 %
EOSINOPHIL # BLD MANUAL: 0 X10*3/UL (ref 0–0.7)
EOSINOPHIL NFR BLD MANUAL: 0 %
ERYTHROCYTE [DISTWIDTH] IN BLOOD BY AUTOMATED COUNT: 21.9 % (ref 11.5–14.5)
HCT VFR BLD AUTO: 31.6 % (ref 41–52)
HGB BLD-MCNC: 8.7 G/DL (ref 13.5–17.5)
HYPOCHROMIA BLD QL SMEAR: NORMAL
IMM GRANULOCYTES # BLD AUTO: 0.02 X10*3/UL (ref 0–0.7)
IMM GRANULOCYTES NFR BLD AUTO: 0.3 % (ref 0–0.9)
LYMPHOCYTES # BLD MANUAL: 2.21 X10*3/UL (ref 1.2–4.8)
LYMPHOCYTES NFR BLD MANUAL: 31.6 %
MCH RBC QN AUTO: 19 PG (ref 26–34)
MCHC RBC AUTO-ENTMCNC: 27.5 G/DL (ref 32–36)
MCV RBC AUTO: 69 FL (ref 80–100)
MONOCYTES # BLD MANUAL: 0.66 X10*3/UL (ref 0.1–1)
MONOCYTES NFR BLD MANUAL: 9.4 %
NEUTROPHILS # BLD MANUAL: 3.95 X10*3/UL (ref 1.2–7.7)
NEUTS BAND # BLD MANUAL: 0.06 X10*3/UL (ref 0–0.7)
NEUTS BAND NFR BLD MANUAL: 0.9 %
NEUTS SEG # BLD MANUAL: 3.89 X10*3/UL (ref 1.2–7)
NEUTS SEG NFR BLD MANUAL: 55.6 %
NRBC BLD-RTO: 0 /100 WBCS (ref 0–0)
OVALOCYTES BLD QL SMEAR: NORMAL
PLATELET # BLD AUTO: 162 X10*3/UL (ref 150–450)
RBC # BLD AUTO: 4.57 X10*6/UL (ref 4.5–5.9)
RBC MORPH BLD: NORMAL
SCHISTOCYTES BLD QL SMEAR: NORMAL
TOTAL CELLS COUNTED BLD: 117
VARIANT LYMPHS # BLD MANUAL: 0.12 X10*3/UL (ref 0–0.5)
VARIANT LYMPHS NFR BLD: 1.7 %
WBC # BLD AUTO: 7 X10*3/UL (ref 4.4–11.3)

## 2024-01-13 PROCEDURE — 2500000001 HC RX 250 WO HCPCS SELF ADMINISTERED DRUGS (ALT 637 FOR MEDICARE OP)

## 2024-01-13 PROCEDURE — 1130000001 HC PRIVATE PED ROOM DAILY

## 2024-01-13 PROCEDURE — 2500000004 HC RX 250 GENERAL PHARMACY W/ HCPCS (ALT 636 FOR OP/ED)

## 2024-01-13 PROCEDURE — 85027 COMPLETE CBC AUTOMATED: CPT

## 2024-01-13 PROCEDURE — 2500000004 HC RX 250 GENERAL PHARMACY W/ HCPCS (ALT 636 FOR OP/ED): Performed by: STUDENT IN AN ORGANIZED HEALTH CARE EDUCATION/TRAINING PROGRAM

## 2024-01-13 PROCEDURE — 36415 COLL VENOUS BLD VENIPUNCTURE: CPT

## 2024-01-13 PROCEDURE — 2500000005 HC RX 250 GENERAL PHARMACY W/O HCPCS

## 2024-01-13 PROCEDURE — 99233 SBSQ HOSP IP/OBS HIGH 50: CPT | Performed by: PEDIATRICS

## 2024-01-13 PROCEDURE — 85007 BL SMEAR W/DIFF WBC COUNT: CPT

## 2024-01-13 PROCEDURE — 6360000002 HC RX 636 FACTOR: Mod: JZ | Performed by: STUDENT IN AN ORGANIZED HEALTH CARE EDUCATION/TRAINING PROGRAM

## 2024-01-13 PROCEDURE — 2500000001 HC RX 250 WO HCPCS SELF ADMINISTERED DRUGS (ALT 637 FOR MEDICARE OP): Performed by: STUDENT IN AN ORGANIZED HEALTH CARE EDUCATION/TRAINING PROGRAM

## 2024-01-13 PROCEDURE — 6360000002 HC RX 636 FACTOR: Mod: JZ

## 2024-01-13 PROCEDURE — A4217 STERILE WATER/SALINE, 500 ML: HCPCS

## 2024-01-13 RX ORDER — TRANEXAMIC ACID 650 MG/1
1300 TABLET ORAL 3 TIMES DAILY
Status: DISCONTINUED | OUTPATIENT
Start: 2024-01-13 | End: 2024-01-14 | Stop reason: HOSPADM

## 2024-01-13 RX ORDER — METHYLPREDNISOLONE 4 MG/1
4 TABLET ORAL ONCE
Status: COMPLETED | OUTPATIENT
Start: 2024-01-14 | End: 2024-01-14

## 2024-01-13 RX ORDER — OXYCODONE HYDROCHLORIDE 5 MG/1
5 TABLET ORAL EVERY 4 HOURS PRN
Status: DISCONTINUED | OUTPATIENT
Start: 2024-01-13 | End: 2024-01-14 | Stop reason: HOSPADM

## 2024-01-13 RX ORDER — METHYLPREDNISOLONE 4 MG/1
4 TABLET ORAL ONCE
Status: DISCONTINUED | OUTPATIENT
Start: 2024-01-14 | End: 2024-01-14 | Stop reason: HOSPADM

## 2024-01-13 RX ORDER — CHLORHEXIDINE GLUCONATE ORAL RINSE 1.2 MG/ML
15 SOLUTION DENTAL 3 TIMES DAILY
Status: DISCONTINUED | OUTPATIENT
Start: 2024-01-13 | End: 2024-01-14 | Stop reason: HOSPADM

## 2024-01-13 RX ADMIN — OXYCODONE HYDROCHLORIDE 5 MG: 5 TABLET ORAL at 09:49

## 2024-01-13 RX ADMIN — OXYCODONE HYDROCHLORIDE 5 MG: 5 TABLET ORAL at 23:00

## 2024-01-13 RX ADMIN — COAGULATION FACTOR VIIA (RECOMBINANT) 6 MG: KIT at 08:44

## 2024-01-13 RX ADMIN — METHYLPREDNISOLONE 24 MG: 16 TABLET ORAL at 17:25

## 2024-01-13 RX ADMIN — ACETAMINOPHEN 650 MG: 325 TABLET ORAL at 11:26

## 2024-01-13 RX ADMIN — OXYCODONE HYDROCHLORIDE 5 MG: 5 TABLET ORAL at 03:51

## 2024-01-13 RX ADMIN — MELATONIN 3 MG: 3 TAB ORAL at 23:05

## 2024-01-13 RX ADMIN — PETROLATUM: 420 OINTMENT TOPICAL at 20:42

## 2024-01-13 RX ADMIN — AMOXICILLIN 500 MG: 500 CAPSULE ORAL at 16:50

## 2024-01-13 RX ADMIN — ACETAMINOPHEN 650 MG: 325 TABLET ORAL at 05:39

## 2024-01-13 RX ADMIN — MORPHINE SULFATE 4 MG: 4 INJECTION INTRAVENOUS at 01:44

## 2024-01-13 RX ADMIN — ACETAMINOPHEN 650 MG: 325 TABLET ORAL at 17:25

## 2024-01-13 RX ADMIN — COAGULATION FACTOR VIIA (RECOMBINANT) 6 MG: KIT at 04:29

## 2024-01-13 RX ADMIN — OXYCODONE HYDROCHLORIDE 5 MG: 5 TABLET ORAL at 16:49

## 2024-01-13 RX ADMIN — ACETAMINOPHEN 650 MG: 325 TABLET ORAL at 00:22

## 2024-01-13 RX ADMIN — OXYCODONE HYDROCHLORIDE 5 MG: 5 TABLET ORAL at 13:48

## 2024-01-13 RX ADMIN — CHLORHEXIDINE GLUCONATE 0.12% ORAL RINSE 15 ML: 1.2 LIQUID ORAL at 20:45

## 2024-01-13 RX ADMIN — TRANEXAMIC ACID 740 MG: 100 INJECTION INTRAVENOUS at 05:39

## 2024-01-13 RX ADMIN — AMOXICILLIN 500 MG: 500 CAPSULE ORAL at 08:44

## 2024-01-13 RX ADMIN — TRANEXAMIC ACID 1300 MG: 650 TABLET ORAL at 20:41

## 2024-01-13 RX ADMIN — TRANEXAMIC ACID 500 MG: 100 INJECTION INTRAVENOUS at 08:44

## 2024-01-13 RX ADMIN — AMOXICILLIN 500 MG: 500 CAPSULE ORAL at 00:22

## 2024-01-13 RX ADMIN — COAGULATION FACTOR VIIA (RECOMBINANT) 6 MG: KIT at 00:34

## 2024-01-13 RX ADMIN — COAGULATION FACTOR VIIA (RECOMBINANT) 6 MG: KIT at 22:12

## 2024-01-13 RX ADMIN — COAGULATION FACTOR VIIA (RECOMBINANT) 6 MG: KIT at 14:15

## 2024-01-13 RX ADMIN — TRANEXAMIC ACID 1300 MG: 650 TABLET ORAL at 15:34

## 2024-01-13 RX ADMIN — MORPHINE SULFATE 4 MG: 4 INJECTION INTRAVENOUS at 05:39

## 2024-01-13 ASSESSMENT — PAIN SCALES - GENERAL
PAINLEVEL_OUTOF10: 7
PAINLEVEL_OUTOF10: 7
PAINLEVEL_OUTOF10: 5 - MODERATE PAIN
PAINLEVEL_OUTOF10: 6
PAINLEVEL_OUTOF10: 5 - MODERATE PAIN

## 2024-01-13 ASSESSMENT — PAIN INTENSITY VAS
VAS_PAIN_GENERAL: 8
VAS_PAIN_GENERAL: 7
VAS_PAIN_GENERAL: 7
VAS_PAIN_GENERAL: 8

## 2024-01-13 ASSESSMENT — PAIN - FUNCTIONAL ASSESSMENT
PAIN_FUNCTIONAL_ASSESSMENT: 0-10

## 2024-01-13 ASSESSMENT — PAIN DESCRIPTION - LOCATION: LOCATION: MOUTH

## 2024-01-13 NOTE — PROGRESS NOTES
Brad Benedict is a 18 y.o. male on day 2 of admission. The patient is 2 days status post extraction of teeth #1,16,17,32.     Subjective   The patient is hemostatic at the extraction site #32. The patient has post extraction pain that is being managed with primary team. The patient reported having discomfort due to swelling of his mandible bilaterally, but improved from previous exam.     Objective     CONSTITUTIONAL/GENERAL:  Well appearing. No acute distress. Laying comfortably in bed. OOBTB    HEAD:  Bilateral mandibular swelling R>L, consistent with procedure.    EYES:  EOM intact, sclera normal, without conjunctival erythema or drainage.    EARS:  Normal external ears, external auditory canals.     NOSE:  External nose midline, no bleeding or drainage. Sinuses non-tender to palpation bilaterally.    ORAL CAVITY/OROPHARYNX/LIPS:  Normal and moist mucous membranes, normal floor of mouth/tongue/oropharnx, no masses or lesions. No fractures or avulsion of teeth. Stable and reproducible occlusion. No TMJ clicking or popping. Maximal incisal opening ~30mm.  Extraction site # 1,16,17, 32 hemostatic with no oozing. Right lower lip swelling likely due to lip biting.     NECK/LMYPH/GLANDS: Parotid and submandibular glands without edema or tenderness bilaterally. Patient tolerating oral secretions without issue. No lymphadenopathy of head or neck. Mild neck limited motion due to post-op swelling.     RESPIRATION/VOICE:  Breathing comfortably on room air. No hoarseness, wheezing, stridor, or other abnormality.     NEURO:  Awake and alert. Oriented to person, place, and time.  Cranial nerves II-XII grossly intact and symmetric bilaterally. No anesthesia or paresthesia of CN V bilaterally. CN VII without defects, patient showing full facial range of expression.    EXTREMITIES: No lesions or abnormalities visualized. Denies tenderness to palpation bilaterally.      PSYCH:  Appropriate mood and affect.      Last Recorded  "Vitals  Blood pressure 129/60, pulse 74, temperature 37.2 °C (99 °F), temperature source Axillary, resp. rate 16, height 1.746 m (5' 8.74\"), weight 72.6 kg (160 lb 0.9 oz), SpO2 97 %.  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 The patient is hemostatic  and clear for [place] [facility] [place], given he maintains hemostasis.     Recs:   - Non - chew diet for 7 days.   - Chlorhexidine 0.12% oral rinse TID for 7 days   - Medrol dose pack if not contraindicated   - Pain management per primary team   - Amoxicillin 500mg for 7 days   - FS signing off  - The patient will [place] [facility] [place] week after discharge.     The Oral & Maxillofacial Surgery clinic is located on the first floor within the [facility] ([address] [place] [address]).  Our office phone number for patients is [phone]. For any questions regarding patient care, please contact the [email or web address] at [phone]. Patients can contact the [email or web address]-asnt through the hospital  [phone].     Ofe Flores DMD  [facility] PGY-1   Pager [phone]        "

## 2024-01-13 NOTE — CARE PLAN
The patient's goals for the shift include      The clinical goals for the shift include Patient will continue to have decreased bleeding during my shift.    Brad vitals stable and afebrile throughout the night. Pt has had no complaints of bleeding and has not had to suction at all throughout the night. Pt has had consistent pain with breakthrough Oxy and Morphine. Pt has been eating and drinking and is off IVF.

## 2024-01-14 VITALS
WEIGHT: 160.05 LBS | SYSTOLIC BLOOD PRESSURE: 131 MMHG | BODY MASS INDEX: 23.71 KG/M2 | RESPIRATION RATE: 20 BRPM | DIASTOLIC BLOOD PRESSURE: 60 MMHG | TEMPERATURE: 98.4 F | OXYGEN SATURATION: 100 % | HEART RATE: 88 BPM | HEIGHT: 69 IN

## 2024-01-14 DIAGNOSIS — K08.89 TOOTH PAIN: Primary | ICD-10-CM

## 2024-01-14 PROCEDURE — 94760 N-INVAS EAR/PLS OXIMETRY 1: CPT

## 2024-01-14 PROCEDURE — 2500000001 HC RX 250 WO HCPCS SELF ADMINISTERED DRUGS (ALT 637 FOR MEDICARE OP)

## 2024-01-14 PROCEDURE — 6360000002 HC RX 636 FACTOR: Mod: JZ | Performed by: STUDENT IN AN ORGANIZED HEALTH CARE EDUCATION/TRAINING PROGRAM

## 2024-01-14 PROCEDURE — 2500000004 HC RX 250 GENERAL PHARMACY W/ HCPCS (ALT 636 FOR OP/ED)

## 2024-01-14 RX ORDER — OXYCODONE HYDROCHLORIDE 5 MG/1
5 TABLET ORAL EVERY 6 HOURS
Qty: 8 TABLET | Refills: 0 | Status: SHIPPED | OUTPATIENT
Start: 2024-01-14 | End: 2024-01-16

## 2024-01-14 RX ORDER — OXYCODONE HYDROCHLORIDE 5 MG/1
5 TABLET ORAL EVERY 6 HOURS PRN
Qty: 8 TABLET | Refills: 0 | Status: CANCELLED | OUTPATIENT
Start: 2024-01-14 | End: 2024-01-16

## 2024-01-14 RX ORDER — CHLORHEXIDINE GLUCONATE ORAL RINSE 1.2 MG/ML
15 SOLUTION DENTAL 3 TIMES DAILY
Qty: 100 ML | Refills: 0 | Status: SHIPPED | OUTPATIENT
Start: 2024-01-14

## 2024-01-14 RX ORDER — METHYLPREDNISOLONE 4 MG/1
TABLET ORAL
Qty: 21 TABLET | Refills: 0 | Status: SHIPPED | OUTPATIENT
Start: 2024-01-14 | End: 2024-01-21

## 2024-01-14 RX ORDER — TRANEXAMIC ACID 650 MG/1
1300 TABLET ORAL 3 TIMES DAILY
Qty: 42 TABLET | Refills: 0 | Status: SHIPPED | OUTPATIENT
Start: 2024-01-14 | End: 2024-01-21

## 2024-01-14 RX ORDER — AMOXICILLIN 500 MG/1
500 CAPSULE ORAL EVERY 8 HOURS SCHEDULED
Qty: 15 CAPSULE | Refills: 0 | Status: SHIPPED | OUTPATIENT
Start: 2024-01-14 | End: 2024-01-19

## 2024-01-14 RX ADMIN — AMOXICILLIN 500 MG: 500 CAPSULE ORAL at 00:05

## 2024-01-14 RX ADMIN — COAGULATION FACTOR VIIA (RECOMBINANT) 6 MG: KIT at 06:07

## 2024-01-14 RX ADMIN — METHYLPREDNISOLONE 4 MG: 4 TABLET ORAL at 12:10

## 2024-01-14 RX ADMIN — ACETAMINOPHEN 650 MG: 325 TABLET ORAL at 12:10

## 2024-01-14 RX ADMIN — ACETAMINOPHEN 650 MG: 325 TABLET ORAL at 06:07

## 2024-01-14 RX ADMIN — ACETAMINOPHEN 650 MG: 325 TABLET ORAL at 00:05

## 2024-01-14 RX ADMIN — AMOXICILLIN 500 MG: 500 CAPSULE ORAL at 08:39

## 2024-01-14 RX ADMIN — TRANEXAMIC ACID 1300 MG: 650 TABLET ORAL at 08:39

## 2024-01-14 RX ADMIN — METHYLPREDNISOLONE 4 MG: 4 TABLET ORAL at 05:09

## 2024-01-14 RX ADMIN — CHLORHEXIDINE GLUCONATE 0.12% ORAL RINSE 15 ML: 1.2 LIQUID ORAL at 08:39

## 2024-01-14 RX ADMIN — COAGULATION FACTOR VIIA (RECOMBINANT) 6 MG: KIT at 14:07

## 2024-01-14 ASSESSMENT — PAIN SCALES - GENERAL
PAINLEVEL_OUTOF10: 5 - MODERATE PAIN
PAINLEVEL_OUTOF10: 5 - MODERATE PAIN
PAINLEVEL_OUTOF10: 4
PAINLEVEL_OUTOF10: 6

## 2024-01-14 ASSESSMENT — PAIN - FUNCTIONAL ASSESSMENT
PAIN_FUNCTIONAL_ASSESSMENT: 0-10

## 2024-01-14 NOTE — CARE PLAN
Patient AVSS on RA throughout shift. Patient states pain at tolerable level with scheduled medications per MAR, no PRNs given this shift. Discharge orders received. IV removed, catheter intact. Discharge instructions and medications reviewed with patient. Patient states understanding with no further questions or concerns at this time. Patient left unit with family and all patient belongings.

## 2024-01-14 NOTE — PROGRESS NOTES
Daily Progress Note  [facility]  Pediatric Hematology & Oncology    Patient's Name: Brad Benedict  : 2005  MR#: [id number]  Attending Physician: Sid Perez MD  LOS: Hospital Day: 3    Subjective   Overnight Brad had significant pain () associated w/ htn breakthrough his q6hr oxycodone, so q4hr morphine was restarted. He received two doses of morphine.        Objective     Diet:  Dietary Orders (From admission, onward)       Start     Ordered    24  May Participate in Room Service  Once        Question:  .  Answer:  Yes    24 0753    01/11/24 145  Pediatric diet Regular; Easy to chew  Diet effective now        Question Answer Comment   Diet type Regular    Texture Easy to chew        24                    Medications:  Scheduled Meds: acetaminophen, 650 mg, oral, q6h  amoxicillin, 500 mg, oral, q8h LYNETTE  coagulation factor VIIa (recomb), 6 mg, intravenous, q8h  [START ON 2024] methylPREDNISolone, 4 mg, oral, Once  [START ON 2024] methylPREDNISolone, 4 mg, oral, Once  [START ON 2024] methylPREDNISolone, 4 mg, oral, Once  tranexamic acid, 1,300 mg, oral, TID      Continuous Infusions:    PRN Meds: PRN medications: chlorhexidine, melatonin, morphine, oxyCODONE, white petrolatum    Peripheral IV 24 22 G 2.5 cm Right;Anterior Forearm (Active)   Site Assessment Clean;Dry;Intact 24 1600   Dressing Type Transparent 24 1600   Line Status Flushed 24 1600   Dressing Status Clean;Dry;Occlusive 24 1600       Vitals:  Temp:  [36.6 °C (97.9 °F)-37.2 °C (99 °F)] 36.6 °C (97.9 °F)  Heart Rate:  [74-90] 77  Resp:  [16-18] 16  BP: (126-149)/(60-80) 133/71  Temp (24hrs), Av.8 °C (98.2 °F), Min:36.6 °C (97.9 °F), Max:37.2 °C (99 °F)    Wt Readings from Last 3 Encounters:   24 72.6 kg (160 lb 0.9 oz) (57 %, Z= 0.18)*   23 74.1 kg (163 lb 5.8 oz) (62 %, Z= 0.31)*   22 69 kg (152 lb 2 oz) (53 %, Z=  0.08)*     * Growth percentiles are based on [facility] (Boys, 2-20 Years) data.     Pain Score: 5 - Moderate pain  VAS Pain Score:  [7-8]        I/O:    Intake/Output Summary (Last 24 hours) at 1/13/2024 2013  Last data filed at 1/13/2024 1754  Gross per 24 hour   Intake 2440 ml   Output 0 ml   Net 2440 ml        Exam:   Physical Exam  Constitutional:       General: He is not in acute distress.  HENT:      Head: Normocephalic and atraumatic.      Comments: Bilateral symmetric buccal swelling     Nose: Nose normal.      Mouth/Throat:      Mouth: Mucous membranes are moist.      Comments: Unable to open mouth as wide as prior exams, no active gingival bleeding appreciated  Eyes:      Extraocular Movements: Extraocular movements intact.      Conjunctiva/sclera: Conjunctivae normal.   Cardiovascular:      Rate and Rhythm: Normal rate and regular rhythm.      Heart sounds: No murmur heard.     No friction rub. No gallop.   Pulmonary:      Effort: Pulmonary effort is normal.      Breath sounds: Normal breath sounds.   Abdominal:      General: There is no distension.      Palpations: Abdomen is soft.      Tenderness: There is no abdominal tenderness.   Skin:     General: Skin is warm and dry.      Capillary Refill: Capillary refill takes less than 2 seconds.   Neurological:      General: No focal deficit present.      Mental Status: He is alert and oriented to person, place, and time.         Lab Studies Reviewed:  Results for orders placed or performed during the hospital encounter of 01/11/24 (from the past 24 hour(s))   CBC and Auto Differential   Result Value Ref Range    WBC 7.0 4.4 - 11.3 x10*3/uL    nRBC 0.0 0.0 - 0.0 /100 WBCs    RBC 4.57 4.50 - 5.90 x10*6/uL    Hemoglobin 8.7 (L) 13.5 - 17.5 g/dL    Hematocrit 31.6 (L) 41.0 - 52.0 %    MCV 69 (L) 80 - 100 fL    MCH 19.0 (L) 26.0 - 34.0 pg    MCHC 27.5 (L) 32.0 - 36.0 g/dL    RDW 21.9 (H) 11.5 - 14.5 %    Platelets 162 150 - 450 x10*3/uL    Immature Granulocytes %,  Automated 0.3 0.0 - 0.9 %    Immature Granulocytes Absolute, Automated 0.02 0.00 - 0.70 x10*3/uL   Manual Differential   Result Value Ref Range    Neutrophils %, Manual 55.6 40.0 - 80.0 %    Bands %, Manual 0.9 0.0 - 5.0 %    Lymphocytes %, Manual 31.6 13.0 - 44.0 %    Monocytes %, Manual 9.4 2.0 - 10.0 %    Eosinophils %, Manual 0.0 0.0 - 6.0 %    Basophils %, Manual 0.8 0.0 - 2.0 %    Atypical Lymphocytes %, Manual 1.7 0.0 - 2.0 %    Seg Neutrophils Absolute, Manual 3.89 1.20 - 7.00 x10*3/uL    Bands Absolute, Manual 0.06 0.00 - 0.70 x10*3/uL    Lymphocytes Absolute, Manual 2.21 1.20 - 4.80 x10*3/uL    Monocytes Absolute, Manual 0.66 0.10 - 1.00 x10*3/uL    Eosinophils Absolute, Manual 0.00 0.00 - 0.70 x10*3/uL    Basophils Absolute, Manual 0.06 0.00 - 0.10 x10*3/uL    Atypical Lymphs Absolute, Manual 0.12 0.00 - 0.50 x10*3/uL    Total Cells Counted 117     Neutrophils Absolute, Manual 3.95 1.20 - 7.70 x10*3/uL    RBC Morphology See Below     Hypochromia Marked     RBC Fragments Few     Ovalocytes Many        Imaging Studies Reviewed:          Assessment/Plan   Brad is a 17yo w/ Glanzmann thrombasthenia POD 2 wisdom tooth extraction admitted for monitoring d/t hemorrhage risk currently on NovoSeven, IV TXA, & topical TXA. His bleeding has improved, so we will space his NovoSeven to q6hr and transition to oral TXA for a 7 day total course. His pain remains poorly controlled, so we will condense his prn oxycodone to q4hr and monitor his response with a goal of no morphine requirements. We will also start a medrol dose pack in an effort to reduce his swelling and subsequently reduce his pain. Detailed plan below.     Post-op bleeding  - NovoSeven 6mg q6hr  - PO TXA 1300mg q8hr    Post-op pain  - Tylenol q6hr  - Oxycodone 5mg q4hr PRN  - Morphine 4mg q4hr PRN  - Medrol dose pack     Post-op infection ppx  - Amoxicillin 500mg q8hr  - Chlorhexidine mouthwash TID     Iron deficiency anemia  - S/p venofer, no further  inpatient intervention required    Patient seen and discussed with my attending, Dr. Perez.    Marycruz Valiente MD  PGY-1, Pediatrics  I saw and evaluated the patient. I personally obtained the key and critical portions of the history and physical exam or was physically present for key and critical portions performed by the resident/fellow. I reviewed the resident/fellow's documentation and discussed the patient with the resident/fellow. I agree with the resident/fellow's medical decision making as documented in the note.

## 2024-01-14 NOTE — CARE PLAN
Patient remains afebrile with VSS on RA. Patient with pain 5-6/10 when awake and required one dose PRN oxy for pain this shift. Patient Poing well before bed, patient also with UOP before bed. Patient tolerating all scheduled and PRN meds given this shift. No family at the bedside this shift.  Problem: Pain  Goal: Takes deep breaths with improved pain control throughout the shift  Outcome: Progressing  Goal: Turns in bed with improved pain control throughout the shift  Outcome: Progressing  Goal: Walks with improved pain control throughout the shift  Outcome: Progressing  Goal: Performs ADL's with improved pain control throughout shift  Outcome: Progressing  Goal: Participates in PT with improved pain control throughout the shift  Outcome: Progressing  Goal: Free from opioid side effects throughout the shift  Outcome: Progressing  Goal: Free from acute confusion related to pain meds throughout the shift  Outcome: Progressing

## 2024-01-14 NOTE — DISCHARGE INSTRUCTIONS
It was a pleasure taking care of you at [place]! You were admitted to monitor for bleeding after having your wisdom teeth extracted. Please continue using the antiseptic mouthwash 3x a day through 1/17. You should also continue taking the medrol (steroids), amoxicillin, and TXA as prescribed.

## 2024-01-14 NOTE — DISCHARGE SUMMARY
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 He received Novoseven prior to the procedure, and was initiated on Novoseven scheduled q 2 hours, IV tranexamic acid and topical tranexamic acid due to significant bleeding from right lower wisdom tooth extraction site. He continued to have mild oozing and Novoseven was slowly weaned to q 4 hrs in which bleeding cessation was achieved after ~36 hours. Novoseven was further weaned to q 6 hours, then q 8 hrs without additional bleeding. Brad had significant pain at extraction sites requiring IV morphine which was transitioned to oxycodone. He also had swelling of bilateral jaw region (R>L) and was prescribed solumedrol pack per OMFS. Brad has iron deficinecy anemia and received a Venofer infusion throughout this hospitalization. Brad has overall clinically improved and is comfortable being discharged home. He will continue Amoxicillin x 7 days, Solumedrol x 5 days, and tranexamic acid x 7 days. He will follow up with [facility] team as outpatient.     Pertinent Physical Exam At Time of Discharge  Physical Exam  Constitutional:       General: He is not in acute distress.  HENT:      Head: Normocephalic and atraumatic.      Comments: Bilateral symmetric buccal swelling, improved. Unable to fully open mouth     Nose: Nose normal.      Mouth/Throat:      Mouth: Mucous membranes are moist.      Comments: Significant bruising at posterior oropharynx likely secondary to trauma from intubation, no active bleeding at extraction sites, sutures at extraction site visualized and intact  Eyes:      Extraocular Movements: Extraocular movements intact.      Conjunctiva/sclera: Conjunctivae normal.    Cardiovascular:      Rate and Rhythm: Normal rate and regular rhythm.      Heart sounds: No murmur heard.     No friction rub. No gallop.   Pulmonary:      Effort: Pulmonary effort is normal.      Breath sounds: Normal breath sounds.   Abdominal:      General: There is no distension.      Palpations: Abdomen is soft.      Tenderness: There is no abdominal tenderness.   Skin:     General: Skin is warm and dry.      Capillary Refill: Capillary refill takes less than 2 seconds.   Neurological:      General: No focal deficit present.      Mental Status: He is alert and oriented to person, place, and time.   Psychiatric:         Mood and Affect: Mood normal.         Behavior: Behavior normal.         Home Medications     Medication List      ASK your doctor about these medications     Normal Saline Flush flush; Generic drug: sodium chloride 0.9%; Infuse 10   mL into a venous catheter if needed for line care (to flush when infusing   NovoSeven).       Outpatient Follow-Up  No future appointments.    Marycruz Valiente MD

## 2024-07-11 DIAGNOSIS — D69.1: Primary | ICD-10-CM

## 2024-07-23 ENCOUNTER — DOCUMENTATION (OUTPATIENT)
Dept: PEDIATRIC HEMATOLOGY/ONCOLOGY | Facility: HOSPITAL | Age: 19
End: 2024-07-23

## 2024-07-23 ENCOUNTER — HOSPITAL ENCOUNTER (OUTPATIENT)
Dept: PEDIATRIC HEMATOLOGY/ONCOLOGY | Facility: HOSPITAL | Age: 19
Discharge: HOME | End: 2024-07-23
Payer: COMMERCIAL

## 2024-07-23 VITALS
TEMPERATURE: 97 F | RESPIRATION RATE: 18 BRPM | HEART RATE: 75 BPM | OXYGEN SATURATION: 97 % | BODY MASS INDEX: 25.96 KG/M2 | WEIGHT: 175.27 LBS | HEIGHT: 69 IN | DIASTOLIC BLOOD PRESSURE: 73 MMHG | SYSTOLIC BLOOD PRESSURE: 123 MMHG

## 2024-07-23 DIAGNOSIS — R04.0 EPISTAXIS: Primary | ICD-10-CM

## 2024-07-23 DIAGNOSIS — D50.9 MICROCYTIC ANEMIA: ICD-10-CM

## 2024-07-23 LAB
BASOPHILS # BLD AUTO: 0.05 X10*3/UL (ref 0–0.1)
BASOPHILS NFR BLD AUTO: 1.1 %
EOSINOPHIL # BLD AUTO: 0.05 X10*3/UL (ref 0–0.7)
EOSINOPHIL NFR BLD AUTO: 1.1 %
ERYTHROCYTE [DISTWIDTH] IN BLOOD BY AUTOMATED COUNT: 15.1 % (ref 11.5–14.5)
FERRITIN SERPL-MCNC: 22 NG/ML (ref 20–300)
HCT VFR BLD AUTO: 35.5 % (ref 41–52)
HGB BLD-MCNC: 10.8 G/DL (ref 13.5–17.5)
HGB RETIC QN: 19 PG (ref 28–38)
IMM GRANULOCYTES # BLD AUTO: 0 X10*3/UL (ref 0–0.7)
IMM GRANULOCYTES NFR BLD AUTO: 0 % (ref 0–0.9)
IMMATURE RETIC FRACTION: 19.6 %
IRON SATN MFR SERPL: 3 % (ref 25–45)
IRON SERPL-MCNC: 14 UG/DL (ref 35–150)
LYMPHOCYTES # BLD AUTO: 1.68 X10*3/UL (ref 1.2–4.8)
LYMPHOCYTES NFR BLD AUTO: 35.8 %
MCH RBC QN AUTO: 21.7 PG (ref 26–34)
MCHC RBC AUTO-ENTMCNC: 30.4 G/DL (ref 32–36)
MCV RBC AUTO: 71 FL (ref 80–100)
MONOCYTES # BLD AUTO: 0.59 X10*3/UL (ref 0.1–1)
MONOCYTES NFR BLD AUTO: 12.6 %
NEUTROPHILS # BLD AUTO: 2.32 X10*3/UL (ref 1.2–7.7)
NEUTROPHILS NFR BLD AUTO: 49.4 %
NRBC BLD-RTO: 0 /100 WBCS (ref 0–0)
PLATELET # BLD AUTO: 167 X10*3/UL (ref 150–450)
RBC # BLD AUTO: 4.98 X10*6/UL (ref 4.5–5.9)
RETICS #: 0.08 X10*6/UL (ref 0.02–0.12)
RETICS/RBC NFR AUTO: 1.7 % (ref 0.5–2)
TIBC SERPL-MCNC: 463 UG/DL (ref 240–445)
UIBC SERPL-MCNC: 449 UG/DL (ref 110–370)
WBC # BLD AUTO: 4.7 X10*3/UL (ref 4.4–11.3)

## 2024-07-23 PROCEDURE — 82728 ASSAY OF FERRITIN: CPT | Performed by: STUDENT IN AN ORGANIZED HEALTH CARE EDUCATION/TRAINING PROGRAM

## 2024-07-23 PROCEDURE — 85045 AUTOMATED RETICULOCYTE COUNT: CPT | Performed by: STUDENT IN AN ORGANIZED HEALTH CARE EDUCATION/TRAINING PROGRAM

## 2024-07-23 PROCEDURE — 99214 OFFICE O/P EST MOD 30 MIN: CPT | Performed by: PEDIATRICS

## 2024-07-23 PROCEDURE — 99214 OFFICE O/P EST MOD 30 MIN: CPT | Performed by: STUDENT IN AN ORGANIZED HEALTH CARE EDUCATION/TRAINING PROGRAM

## 2024-07-23 PROCEDURE — 36415 COLL VENOUS BLD VENIPUNCTURE: CPT | Performed by: STUDENT IN AN ORGANIZED HEALTH CARE EDUCATION/TRAINING PROGRAM

## 2024-07-23 PROCEDURE — 83540 ASSAY OF IRON: CPT | Performed by: STUDENT IN AN ORGANIZED HEALTH CARE EDUCATION/TRAINING PROGRAM

## 2024-07-23 PROCEDURE — 85025 COMPLETE CBC W/AUTO DIFF WBC: CPT | Performed by: STUDENT IN AN ORGANIZED HEALTH CARE EDUCATION/TRAINING PROGRAM

## 2024-07-23 ASSESSMENT — ENCOUNTER SYMPTOMS
GASTROINTESTINAL NEGATIVE: 1
RESPIRATORY NEGATIVE: 1
CONSTITUTIONAL NEGATIVE: 1
DEPRESSION: 0
NEUROLOGICAL NEGATIVE: 1
ALLERGIC/IMMUNOLOGIC NEGATIVE: 1
CARDIOVASCULAR NEGATIVE: 1
LOSS OF SENSATION IN FEET: 0
BRUISES/BLEEDS EASILY: 1
PSYCHIATRIC NEGATIVE: 1
ENDOCRINE NEGATIVE: 1
EYES NEGATIVE: 1
MUSCULOSKELETAL NEGATIVE: 1
OCCASIONAL FEELINGS OF UNSTEADINESS: 0

## 2024-07-23 ASSESSMENT — PAIN SCALES - GENERAL: PAINLEVEL: 0-NO PAIN

## 2024-07-23 ASSESSMENT — PATIENT HEALTH QUESTIONNAIRE - PHQ9
2. FEELING DOWN, DEPRESSED OR HOPELESS: NOT AT ALL
1. LITTLE INTEREST OR PLEASURE IN DOING THINGS: NOT AT ALL
SUM OF ALL RESPONSES TO PHQ9 QUESTIONS 1 AND 2: 0

## 2024-07-23 NOTE — PROGRESS NOTES
"[facility] PT Consult    Patient: Brad Benedict  MRN: [id number]  07/23/24    Assessment   Brad is a 19 y.o. with PMHx Glanzmann's thrombasthenia who was seen by Physical Therapy in clinic on 7/23/2024. Pt with history or L bicep bleed August 2023 and R ankle injury August 2023. Today pt states that he [place] time in the [place] study abroad. He is now working as a  for the summer before he goes back to school this fall. States he has two more years of school.    Pt states while he was studying abroad that he had a L elbow bleed d/t bumping arm on door frame (April 2024). Not sure if in the joint vs muscle. States he got multiple doses of novoseven (~20) after injury and states it kept re-bleeding and lasted for about a month or month and a half.    Upon assessment, as noted below, pt demonstrating 4/5 L elbow flexion strength and 4/5 L elbow extension strength upon submax MMT. 4+/5  R elbow flexion strength and 4+/5 R elbow extension strength.  Normal ROM grossly R/L elbow. No crepitus, swelling, pain or warmth noted at either elbow. Mild atrophy grossly throughout L UE noted.     Pt also states that he had erma calf bleeds from surfing while abroad (May). States that surfboard repeatedly hit back of calves resulting in bleeds. States he received multiple doses of novoseven after injury and bleeds took ~1 month to resolve. States calves still feel \"tight\". Of note, he also got a tattoo R shin and had impressive bleeding after tattoo.     Upon assessment, as noted below, pt demonstrating 4/5 L ankle inv strength, 4/5 R ankle ev strength, and 4+/5 L DF strength upon submax MMT. He is able to PF against resistance L ankle. Demonstrating  4/5 R ankle inv strength, 4/5 R ankle ev strength, and 4/5 R DF strength upon submax MMT. He is able to PF against resistance R ankle. Normal ROM grossly throughout erma ankles. Gastroc and soleus flexibility WFL erma. No swelling, pain, atrophy, warmth, or crepitus noted at either " "ankle. He does state he intermittently has anterior L ankle pain.     Pt main complaints today of noticing atrophy in L UE, and tightness throughout erma calves. Feels as if he tries to get back into shape, injures himself, and is back to square one. States he does not have much time to work out right now. Education today to start taking it slow getting back into working out, start with HEP as noted below.       Plan/Recommendations:  Seated DF raises  Calf raises    Start slow going back to the gym: lighter weights, machines vs dumbbells     Subjective   Today pt states that he [place] time in [place]. He is now working as a  for the summer before he goes back to school this fall. States he has two more years of school.    Pt states while he was studying abroad that he had a L elbow bleed d/t bumping arm on door frame (April 2024). Not sure if in the joint vs muscle. States he got multiple doses of novoseven (~20) after injury and states it kept re-bleeding and lasted for about a month or month and a half.    Pt also states that he had erma calf bleeds from surfing while abroad (May). States that surfboard repeatedly hit back of calves resulting in bleeds. States he received multiple doses of novoseven after injury and bleeds took ~1 month to resolve. States calves still feel \"tight\". Of note, he also got a tattoo R shin and had impressive bleeding after tattoo.     Pt main complaints today of noticing atrophy in L UE, and tightness throughout erma calves. Feels as if he tries to get back into shape, injures himself, and is back to square one. States he does not have much time to work out right now. Education today to start taking it slow getting back into working out, start with HEP as noted below.       Past Medical History:   Past Medical History:   Diagnosis Date    Acute myringitis, right ear 11/11/2015    Myringitis, acute, right    Chronic sinusitis, unspecified 12/02/2016    " Clinical sinusitis    Cough, unspecified 10/14/2015    Cough    Pain in throat 12/02/2016    Throat discomfort    Personal history of other diseases of the respiratory system 10/14/2015    History of reactive airway disease    Qualitative platelet defects (Multi) 07/20/2022    Glanzmann thrombasthenia       Past Surgical History:   Past Surgical History:   Procedure Laterality Date    OTHER SURGICAL HISTORY  03/27/2019    Nasal cautery       Prophylaxis: No  Novoseven as needed for bleeding episodes    Interim Injury/bleed history  August 2023: L bicep bleed  August 2023: R ankle injury/bleed?  April 2024: L elbow bleed (joint vs muscle?)  May 2024: R/L proximal calf bleeds      Objective   Joint Assessment:  Left Elbow: No swelling, warmth, crepitus or pain noted. Mild Atrophy noted grossly throughout L UE  Right Elbow: No swelling, warmth, crepitus or pain noted  Left Knee: No swelling, warmth, crepitus or pain noted  Right Knee: No swelling, warmth, crepitus or pain noted  Left Ankle: No swelling, warmth, or crepitus noted. Pt states he has intermittent anterior ankle pain  Right Ankle: No swelling, warmth, crepitus or pain noted    Range of Motion:   Elbow Extention (0): Left WNL and Right WNL  Elbow Flexion (0-145): Left WNL and Right WNL  Knee Flexion (0-135): Left WNL and Right WNL  Knee Extension (0): Left WNL and Right WNL  Ankle Plantarflexion (0-50): Left WNL and Right WNL  Ankle Dorsiflexion Knee Extended (0-20): Left WNL and Right WNL  Ankle Dorsiflexion Knee Flexed (0-20): Left WNL and Right WNL  Ankle Inversion (0-30): Left WNL and Right WNL  Ankle Eversion (0-20): Left WNL and Right WNL    Flexibility:   Left Gastroc: WFL  Right Gastroc: WFL  Left Soleus: WFL  Right Soleus: WFL    Submax Manual Muscle Tests:  Left Elbow Flexion: 4/5   Right Elbow Flexion: 4+/5   Left Elbow Extension: 4/5   Right Elbow Extension: 4+/5   Left Knee Flexion: 5/5   Right Knee Flexion: 5/5   Left Knee Extension: 5/5    Right Knee Extension: 5/5   Left Ankle Dorsiflexion: 4+/5   Right Ankle Dorsiflexion: 4/5   Left Ankle Inversion: 4/5   Right Ankle Inversion: 4/5   Left Ankle Eversion: 4/5   Right Ankle Eversion: 4/5     Mobility:  Gait: WFL    Patient/Family Education Provided: Yes, education on muscle remodeling. Education on progressing/regressing exercises. Pt verbalized understanding.     Transitions Education: Yes. Discussion today on avoiding high risk activities. Discussion on the role of exercise throughout the lifespan as a person with a bleeding disorder.     Roopa Zepeda, PT

## 2024-07-23 NOTE — PROGRESS NOTES
Patient ID: Brad Benedict is a 19 y.o. male.  Referring Physician: No referring provider defined for this encounter.  Primary Care Provider: Rosalee Rowe MD    Date of Service:  7/23/2024    SUBJECTIVE:    History of Present Illness:  Bard is a 19 year old male with Glanzmann thrombasthenia  presenting for follow up.     Brad recently underwent wisdom tooth extraction on 1/11/24, in which he received Novoseven prior to the procedure, and was initiated on Novoseven scheduled q 2 hours, IV tranexamic acid and topical tranexamic acid due to significant bleeding from right lower wisdom tooth extraction site, in which hemostasis was achieved after ~36 hours, and Novoseven was weaned.     Since discharge from hospital, Brad returned to school in [place] and had multiple injuries requiring Janusz seven. In April. He sustained an injury to left elbow by direct trauma on a door, which resulted in a left elbow joint bleed requiring ~20 doses of janusz-seven over the course of 4-6 weeks, along with platelet transfusion (HLA matched). At the end of May, Brad required admission for bilateral calf bleed from surfing, in which he required ~25 doses of janusz seven, which resolved by mid June. He then had excessive bleeding at his right lower leg at the end of June from a  tattoo in which he used TXA x 5 days. Brad reports nosebleeds 1-2x week and minor gum bleeding daily. No hematuria or hematochezia.     Brad otherwise reports good energy levels throughout the day. No headaches, dizziness, palpitations or shortness of breath. He consumes an iron rich diet. He is not currently taking iron supplements.     Past Medical History: Brad has a past medical history of Acute myringitis, right ear (11/11/2015), Chronic sinusitis, unspecified (12/02/2016), Cough, unspecified (10/14/2015), Pain in throat (12/02/2016), Personal history of other diseases of the respiratory system (10/14/2015), and Qualitative platelet defects (Multi)  "(07/20/2022).    Surgical History:  Brad has a past surgical history that includes Other surgical history (03/27/2019).    Social History:  Brad reports that he has quit smoking. His smoking use included cigarettes. He has quit using smokeless tobacco. He reports current alcohol use. He reports current drug use. Drug: Marijuana.    No family history on file.    Review of Systems   Constitutional: Negative.    HENT:  Positive for nosebleeds.    Eyes: Negative.    Respiratory: Negative.     Cardiovascular: Negative.    Gastrointestinal: Negative.    Endocrine: Negative.    Genitourinary: Negative.    Musculoskeletal: Negative.    Skin:  Positive for wound. Negative for pallor.   Allergic/Immunologic: Negative.    Neurological: Negative.    Hematological:  Bruises/bleeds easily.   Psychiatric/Behavioral: Negative.         OBJECTIVE:    VS:  /73 (BP Location: Right arm, Patient Position: Sitting, BP Cuff Size: Adult)   Pulse 75   Temp 36.1 °C (97 °F) (Tympanic)   Resp 18   Ht 1.76 m (5' 9.29\")   Wt 79.5 kg (175 lb 4.3 oz)   SpO2 97%   BMI 25.67 kg/m²   BSA: 1.97 meters squared    Physical Exam  Vitals and nursing note reviewed.   Constitutional:       General: He is not in acute distress.     Appearance: Normal appearance.   HENT:      Head: Normocephalic and atraumatic.      Nose: Nose normal.      Mouth/Throat:      Mouth: Mucous membranes are moist.      Pharynx: Oropharynx is clear.   Eyes:      Conjunctiva/sclera: Conjunctivae normal.      Pupils: Pupils are equal, round, and reactive to light.   Cardiovascular:      Rate and Rhythm: Normal rate and regular rhythm.      Pulses: Normal pulses.      Heart sounds: Normal heart sounds. No murmur heard.  Pulmonary:      Effort: Pulmonary effort is normal. No respiratory distress.      Breath sounds: Normal breath sounds.   Abdominal:      General: Abdomen is flat. Bowel sounds are normal.      Palpations: Abdomen is soft.      Tenderness: There is no " abdominal tenderness.   Musculoskeletal:         General: Normal range of motion.      Cervical back: Neck supple.   Skin:     General: Skin is warm.      Capillary Refill: Capillary refill takes less than 2 seconds.      Findings: Bruising (large bruise over left thigh, multiple small bruises on leg and arms,tattoo on right lower leg) present. No rash.   Neurological:      General: No focal deficit present.      Mental Status: He is alert and oriented to person, place, and time. Mental status is at baseline.   Psychiatric:         Mood and Affect: Mood normal.         Laboratory:   Latest Reference Range & Units 07/23/24 13:19   HEMOGLOBIN 13.5 - 17.5 g/dL 10.8 (L)   HEMATOCRIT 41.0 - 52.0 % 35.5 (L)   MCV 80 - 100 fL 71 (L)      Latest Reference Range & Units 07/23/24 13:19   PLATELETS (10*3/UL) IN BLOOD AUTOMATED COUNT, [place] 150 - 450 x10*3/uL 167      Latest Reference Range & Units 07/23/24 13:19   FERRITIN 20 - 300 ng/mL 22   IRON 35 - 150 ug/dL 14 (L)   TIBC 240 - 445 ug/dL 463 (H)   UIBC 110 - 370 ug/dL 449 (H)   % Saturation 25 - 45 % 3 (L)       ASSESSMENT and PLAN:    Assessment: Brad Benedict is a 19 year old male with Glanzmann's Thrombasthenia and JUSTYN secondary to chronic blood loss from mucocutaneous bleeding. Over the last 6 months, Brad has had multiple injuries leading to joint bleeds and muscle bleeds requiring janusz-seven dosing. We discussed the importance of limiting high risk activities due to concern for joint damage, end organ damagem and potential bleeds that can be limb or life threatening. He continues to have mucocutaneous bleeding. Labs today shows JUSTYN with Hb 10.8, MCV 71, low/normal ferritin 22, low serum iron 14, low % sat 3%, high TIBC 463.    Plan:  Glanzmann's Thrombasthenia  - For major bleeds and procedures, will require NovoSeven 6mg q2 hours  - Tranexamic acid 1300 mg TID for mucocutaneous bleeding  - Extensively counseled on limiting high risk fall/injury activities      Iron deficiency anemia secondary to chronic blood loss  - Restart slowFe 45 mg BID     Plan discussed with caregiver who voiced understanding and all questions were answered  Patient was seen and discussed with Pediatric Hematologist/Oncologist, Dr. Tonie Krishnan MD  Fellow (PGY-5), Pediatric Hematology/Oncology

## 2024-07-24 ENCOUNTER — DOCUMENTATION (OUTPATIENT)
Dept: PEDIATRIC HEMATOLOGY/ONCOLOGY | Facility: HOSPITAL | Age: 19
End: 2024-07-24
Payer: COMMERCIAL

## 2024-07-24 NOTE — PROGRESS NOTES
Patient in on 07.23.24 for his annual viisit with Dr. Schilling and the [facility] team. Patient has insurance and no PCP since family doctor as a minor. Patient reports overall health has been a challenge as it related to his bleeding disorder, see medical and PT notes.   Patient reports mental & emotional health are good even with the excessive bleeds over the past months. No depressive episodes, no attempts or discussion of suicide, no attempts at self harm or attempting to hurt others, no manic or depressive mood swings, no rage or uncontrolled anger or outbursts, doesn't isolate, engages well with family, peers and community. managing usual and customary stressors well. However, while in the [place] in exchange program patient did experience some anxiety caused by missing a lot of class due to bleeds and unsure as to how he'll catch up and how it will impact his standing in the program. Currently going through the process of enquiring what his options are now that he[place] the states. Patient reports eating well and sleeping well, still participating in enjoyable activities and engaged with friends. Will have to take more thought and/or precautions when choosing those activities due to bleed history. Patient reports that friends know about his BDO but doesn't really discuss with them what he goes through when having bleeding episodes. Suggested that he allows at least one person to know the possible risks in certain situations, giving that person enough information just in case of an emergency during a time when patient isn't able to communicate. Patient is entering into his gustavo year at [employer] and working part time. Patient reports family is doing well, reports no threats or hazards in the home or at school, basic needs are met, no need for support service referrals at this time.    Transition/Education:   When encountering a bleed, call [facility] immediately   Continue goal of timely treatment during bleeding  episodes  2. Continue to have sufficient amount of product on hand  3. Prior to participating in activities, assess risk/benefit and       potential for bleeding episode  4. Due to excessive bleeding during leg tattoo, recommend no more tattoos    Vannessa Levy [facility]  [facility]  Pediatric & Adult

## 2024-07-25 ASSESSMENT — ENCOUNTER SYMPTOMS: WOUND: 1

## 2024-08-27 DIAGNOSIS — D69.1: Primary | ICD-10-CM

## 2024-08-27 RX ORDER — TRANEXAMIC ACID 650 MG/1
1300 TABLET ORAL 3 TIMES DAILY
Qty: 42 TABLET | Refills: 11 | Status: SHIPPED | OUTPATIENT
Start: 2024-08-27 | End: 2024-09-03

## 2024-08-28 DIAGNOSIS — D69.1: Primary | ICD-10-CM

## 2024-10-29 ENCOUNTER — TELEPHONE (OUTPATIENT)
Dept: PEDIATRIC HEMATOLOGY/ONCOLOGY | Facility: HOSPITAL | Age: 19
End: 2024-10-29
Payer: COMMERCIAL

## (undated) DEVICE — DRILL BIT, 1.6MM, CROSS-CUT FISSURE

## (undated) DEVICE — REST, HEAD, BAGEL, 9 IN

## (undated) DEVICE — Device

## (undated) DEVICE — CONTAINER, SPECIMEN, 120 ML, STERILE

## (undated) DEVICE — SUPPORT, FACIOPLASTY, CROWN/CHIIN, LARGE, OVER 27 IN, ELASTIC

## (undated) DEVICE — TIP, SUCTION, FRAZIER, W/CONTROL VENT, 10 FR

## (undated) DEVICE — COVER, CART, 45 X 27 X 48 IN, CLEAR

## (undated) DEVICE — SUTURE, CHROMIC, 3-0, 27 IN, PS2, BROWN

## (undated) DEVICE — MANIFOLD, 4 PORT NEPTUNE STANDARD

## (undated) DEVICE — SEALANT, HEMOSTATIC, FLOSEAL, 10 ML